# Patient Record
Sex: MALE | Race: WHITE | NOT HISPANIC OR LATINO | URBAN - METROPOLITAN AREA
[De-identification: names, ages, dates, MRNs, and addresses within clinical notes are randomized per-mention and may not be internally consistent; named-entity substitution may affect disease eponyms.]

---

## 2017-01-05 ENCOUNTER — OUTPATIENT (OUTPATIENT)
Dept: OUTPATIENT SERVICES | Facility: HOSPITAL | Age: 67
LOS: 1 days | End: 2017-01-05

## 2017-03-31 ENCOUNTER — OUTPATIENT (OUTPATIENT)
Dept: OUTPATIENT SERVICES | Facility: HOSPITAL | Age: 67
LOS: 1 days | End: 2017-03-31

## 2017-12-29 ENCOUNTER — TRANSCRIPTION ENCOUNTER (OUTPATIENT)
Age: 67
End: 2017-12-29

## 2018-01-07 ENCOUNTER — INPATIENT (INPATIENT)
Facility: HOSPITAL | Age: 68
LOS: 1 days | Discharge: HOME | End: 2018-01-09
Attending: INTERNAL MEDICINE | Admitting: FAMILY MEDICINE
Payer: MEDICARE

## 2018-01-07 ENCOUNTER — OUTPATIENT (OUTPATIENT)
Dept: OUTPATIENT SERVICES | Facility: HOSPITAL | Age: 68
LOS: 1 days | End: 2018-01-07

## 2018-01-07 PROCEDURE — 76770 US EXAM ABDO BACK WALL COMP: CPT | Mod: 26

## 2018-01-07 PROCEDURE — 76856 US EXAM PELVIC COMPLETE: CPT | Mod: 26

## 2018-01-07 PROCEDURE — 70450 CT HEAD/BRAIN W/O DYE: CPT | Mod: 26

## 2018-01-07 PROCEDURE — 71046 X-RAY EXAM CHEST 2 VIEWS: CPT | Mod: 26

## 2018-01-07 PROCEDURE — 99285 EMERGENCY DEPT VISIT HI MDM: CPT

## 2018-01-08 ENCOUNTER — OUTPATIENT (OUTPATIENT)
Dept: OUTPATIENT SERVICES | Facility: HOSPITAL | Age: 68
LOS: 1 days | End: 2018-01-08

## 2018-01-09 ENCOUNTER — OUTPATIENT (OUTPATIENT)
Dept: OUTPATIENT SERVICES | Facility: HOSPITAL | Age: 68
LOS: 1 days | End: 2018-01-09

## 2018-01-19 ENCOUNTER — OUTPATIENT (OUTPATIENT)
Dept: OUTPATIENT SERVICES | Facility: HOSPITAL | Age: 68
LOS: 1 days | End: 2018-01-19

## 2018-09-06 ENCOUNTER — OUTPATIENT (OUTPATIENT)
Dept: OUTPATIENT SERVICES | Facility: HOSPITAL | Age: 68
LOS: 1 days | End: 2018-09-06

## 2018-09-21 ENCOUNTER — OUTPATIENT (OUTPATIENT)
Dept: OUTPATIENT SERVICES | Facility: HOSPITAL | Age: 68
LOS: 1 days | End: 2018-09-21

## 2018-10-24 ENCOUNTER — OUTPATIENT (OUTPATIENT)
Dept: OUTPATIENT SERVICES | Facility: HOSPITAL | Age: 68
LOS: 1 days | End: 2018-10-24

## 2018-11-06 ENCOUNTER — OUTPATIENT (OUTPATIENT)
Dept: OUTPATIENT SERVICES | Facility: HOSPITAL | Age: 68
LOS: 1 days | End: 2018-11-06

## 2019-01-08 ENCOUNTER — OUTPATIENT (OUTPATIENT)
Dept: OUTPATIENT SERVICES | Facility: HOSPITAL | Age: 69
LOS: 1 days | End: 2019-01-08

## 2019-02-08 ENCOUNTER — OUTPATIENT (OUTPATIENT)
Dept: OUTPATIENT SERVICES | Facility: HOSPITAL | Age: 69
LOS: 1 days | End: 2019-02-08

## 2019-02-25 ENCOUNTER — OUTPATIENT (OUTPATIENT)
Dept: OUTPATIENT SERVICES | Facility: HOSPITAL | Age: 69
LOS: 1 days | End: 2019-02-25

## 2019-08-05 ENCOUNTER — EMERGENCY (EMERGENCY)
Facility: HOSPITAL | Age: 69
LOS: 1 days | End: 2019-08-05
Admitting: EMERGENCY MEDICINE
Payer: MEDICARE

## 2019-08-05 ENCOUNTER — INPATIENT (INPATIENT)
Facility: HOSPITAL | Age: 69
LOS: 3 days | Discharge: HOME | DRG: 64 | End: 2019-08-09
Attending: FAMILY MEDICINE | Admitting: HOSPITALIST
Payer: MEDICARE

## 2019-08-05 ENCOUNTER — TRANSCRIPTION ENCOUNTER (OUTPATIENT)
Age: 69
End: 2019-08-05

## 2019-08-05 VITALS
SYSTOLIC BLOOD PRESSURE: 147 MMHG | HEART RATE: 62 BPM | OXYGEN SATURATION: 99 % | TEMPERATURE: 98 F | DIASTOLIC BLOOD PRESSURE: 76 MMHG | RESPIRATION RATE: 18 BRPM | HEIGHT: 71 IN | WEIGHT: 229.94 LBS

## 2019-08-05 DIAGNOSIS — I61.9 NONTRAUMATIC INTRACEREBRAL HEMORRHAGE, UNSPECIFIED: ICD-10-CM

## 2019-08-05 DIAGNOSIS — Z98.890 OTHER SPECIFIED POSTPROCEDURAL STATES: Chronic | ICD-10-CM

## 2019-08-05 LAB
ALBUMIN SERPL ELPH-MCNC: 4 G/DL — SIGNIFICANT CHANGE UP (ref 3.3–5.2)
ALP SERPL-CCNC: 100 U/L — SIGNIFICANT CHANGE UP (ref 40–120)
ALT FLD-CCNC: 45 U/L — HIGH
ANION GAP SERPL CALC-SCNC: 13 MMOL/L — SIGNIFICANT CHANGE UP (ref 5–17)
APTT BLD: 25.3 SEC — LOW (ref 27.5–36.3)
AST SERPL-CCNC: 68 U/L — HIGH
BASOPHILS # BLD AUTO: 0.04 K/UL — SIGNIFICANT CHANGE UP (ref 0–0.2)
BASOPHILS NFR BLD AUTO: 0.5 % — SIGNIFICANT CHANGE UP (ref 0–2)
BILIRUB SERPL-MCNC: 0.5 MG/DL — SIGNIFICANT CHANGE UP (ref 0.4–2)
BUN SERPL-MCNC: 41 MG/DL — HIGH (ref 8–20)
CALCIUM SERPL-MCNC: 9.6 MG/DL — SIGNIFICANT CHANGE UP (ref 8.6–10.2)
CHLORIDE SERPL-SCNC: 105 MMOL/L — SIGNIFICANT CHANGE UP (ref 98–107)
CO2 SERPL-SCNC: 23 MMOL/L — SIGNIFICANT CHANGE UP (ref 22–29)
CREAT SERPL-MCNC: 2.51 MG/DL — HIGH (ref 0.5–1.3)
EOSINOPHIL # BLD AUTO: 0.17 K/UL — SIGNIFICANT CHANGE UP (ref 0–0.5)
EOSINOPHIL NFR BLD AUTO: 2.1 % — SIGNIFICANT CHANGE UP (ref 0–6)
GLUCOSE SERPL-MCNC: 110 MG/DL — SIGNIFICANT CHANGE UP (ref 70–115)
HCT VFR BLD CALC: 36.1 % — LOW (ref 39–50)
HGB BLD-MCNC: 11.7 G/DL — LOW (ref 13–17)
IMM GRANULOCYTES NFR BLD AUTO: 0.5 % — SIGNIFICANT CHANGE UP (ref 0–1.5)
INR BLD: 1.03 RATIO — SIGNIFICANT CHANGE UP (ref 0.88–1.16)
LYMPHOCYTES # BLD AUTO: 2.42 K/UL — SIGNIFICANT CHANGE UP (ref 1–3.3)
LYMPHOCYTES # BLD AUTO: 30.5 % — SIGNIFICANT CHANGE UP (ref 13–44)
MCHC RBC-ENTMCNC: 32.4 GM/DL — SIGNIFICANT CHANGE UP (ref 32–36)
MCHC RBC-ENTMCNC: 32.4 PG — SIGNIFICANT CHANGE UP (ref 27–34)
MCV RBC AUTO: 100 FL — SIGNIFICANT CHANGE UP (ref 80–100)
MONOCYTES # BLD AUTO: 0.65 K/UL — SIGNIFICANT CHANGE UP (ref 0–0.9)
MONOCYTES NFR BLD AUTO: 8.2 % — SIGNIFICANT CHANGE UP (ref 2–14)
NEUTROPHILS # BLD AUTO: 4.61 K/UL — SIGNIFICANT CHANGE UP (ref 1.8–7.4)
NEUTROPHILS NFR BLD AUTO: 58.2 % — SIGNIFICANT CHANGE UP (ref 43–77)
PLATELET # BLD AUTO: 269 K/UL — SIGNIFICANT CHANGE UP (ref 150–400)
POTASSIUM SERPL-MCNC: 4 MMOL/L — SIGNIFICANT CHANGE UP (ref 3.5–5.3)
POTASSIUM SERPL-SCNC: 4 MMOL/L — SIGNIFICANT CHANGE UP (ref 3.5–5.3)
PROT SERPL-MCNC: 7.2 G/DL — SIGNIFICANT CHANGE UP (ref 6.6–8.7)
PROTHROM AB SERPL-ACNC: 11.9 SEC — SIGNIFICANT CHANGE UP (ref 10–12.9)
RBC # BLD: 3.61 M/UL — LOW (ref 4.2–5.8)
RBC # FLD: 13.4 % — SIGNIFICANT CHANGE UP (ref 10.3–14.5)
SODIUM SERPL-SCNC: 141 MMOL/L — SIGNIFICANT CHANGE UP (ref 135–145)
WBC # BLD: 7.93 K/UL — SIGNIFICANT CHANGE UP (ref 3.8–10.5)
WBC # FLD AUTO: 7.93 K/UL — SIGNIFICANT CHANGE UP (ref 3.8–10.5)

## 2019-08-05 PROCEDURE — 70450 CT HEAD/BRAIN W/O DYE: CPT | Mod: 26

## 2019-08-05 PROCEDURE — 99285 EMERGENCY DEPT VISIT HI MDM: CPT

## 2019-08-05 PROCEDURE — 99222 1ST HOSP IP/OBS MODERATE 55: CPT

## 2019-08-05 PROCEDURE — 93010 ELECTROCARDIOGRAM REPORT: CPT

## 2019-08-05 PROCEDURE — 70450 CT HEAD/BRAIN W/O DYE: CPT | Mod: 26,77

## 2019-08-05 PROCEDURE — 99284 EMERGENCY DEPT VISIT MOD MDM: CPT

## 2019-08-05 RX ORDER — CARVEDILOL PHOSPHATE 80 MG/1
25 CAPSULE, EXTENDED RELEASE ORAL EVERY 12 HOURS
Refills: 0 | Status: DISCONTINUED | OUTPATIENT
Start: 2019-08-05 | End: 2019-08-06

## 2019-08-05 RX ORDER — CHLORHEXIDINE GLUCONATE 213 G/1000ML
1 SOLUTION TOPICAL
Refills: 0 | Status: DISCONTINUED | OUTPATIENT
Start: 2019-08-05 | End: 2019-08-09

## 2019-08-05 RX ORDER — NICARDIPINE HYDROCHLORIDE 30 MG/1
5 CAPSULE, EXTENDED RELEASE ORAL
Qty: 40 | Refills: 0 | Status: DISCONTINUED | OUTPATIENT
Start: 2019-08-05 | End: 2019-08-06

## 2019-08-05 RX ORDER — HYDRALAZINE HCL 50 MG
10 TABLET ORAL EVERY 6 HOURS
Refills: 0 | Status: DISCONTINUED | OUTPATIENT
Start: 2019-08-05 | End: 2019-08-06

## 2019-08-05 RX ORDER — ATORVASTATIN CALCIUM 80 MG/1
80 TABLET, FILM COATED ORAL AT BEDTIME
Refills: 0 | Status: DISCONTINUED | OUTPATIENT
Start: 2019-08-05 | End: 2019-08-09

## 2019-08-05 RX ORDER — IPRATROPIUM/ALBUTEROL SULFATE 18-103MCG
3 AEROSOL WITH ADAPTER (GRAM) INHALATION EVERY 6 HOURS
Refills: 0 | Status: DISCONTINUED | OUTPATIENT
Start: 2019-08-05 | End: 2019-08-09

## 2019-08-05 RX ADMIN — NICARDIPINE HYDROCHLORIDE 25 MG/HR: 30 CAPSULE, EXTENDED RELEASE ORAL at 23:04

## 2019-08-05 RX ADMIN — CARVEDILOL PHOSPHATE 25 MILLIGRAM(S): 80 CAPSULE, EXTENDED RELEASE ORAL at 23:23

## 2019-08-05 RX ADMIN — Medication 10 MILLIGRAM(S): at 22:30

## 2019-08-05 NOTE — ED ADULT NURSE NOTE - CHIEF COMPLAINT QUOTE
transfer from [facility].  Several weeks of intermittent headache.  Right leg weakness started last night.  Left thalamic bleed.  Alert and oriented X 4.

## 2019-08-05 NOTE — H&P ADULT - ASSESSMENT
68 yo male pmhx HTN, CVA, MI s/p 2 POP placement at [facility] (~3 years ago), Prostate Ca s/p radiation and prostatectomy (~2 years ago), former smoker (quit 3 years ago, 30 pack year) admitted with ICH.     NEURO: ICH, admit to micu, q1h neurochecks, bp control sbp 150-160  CV: HTN, hydralazine IV prn for BP control.  Goal -160.  Hydralazine IV PRN.    RESP: 30 pack year history with occasional inhaler use.  duonebs prn.   RENAL: CKD, avoid nephrotoxic medications, renally dose medications, trend urine output, bun/cr and electrolytes.  Replace electrolytes as needed  GI: NPO for now.   ENDO: No active issues.   ID: No active infectious process.    HEME: Holding a/c and antiplt therapy in setting of ICH.    DISPO: Full code. 70 yo male pmhx HTN, CVA, MI s/p 2 POP placement at [place] (~3 years ago), Prostate Ca s/p radiation and prostatectomy (~2 years ago), former smoker (quit 3 years ago, 30 pack year) admitted with ICH.     NEURO: ICH, admit to micu, q1h neurochecks, bp control sbp 150-160, keep HOB >30 degrees. Aspiration precautions. repeat head ct in 6 hours.     CV: HTN, hydralazine IV prn for BP control.  Goal -160.  Hydralazine IV PRN.  CAD s/p stent; TTE ordered.  RESP: 30 pack year history with occasional inhaler use.  duonebs prn.   RENAL: CKD, avoid nephrotoxic medications, renally dose medications, trend urine output, bun/cr and electrolytes.  Replace electrolytes as needed  GI: NPO for now.   ENDO: No active issues.   ID: No active infectious process.    HEME: Holding a/c and antiplt therapy in setting of ICH.    DISPO: Full code.

## 2019-08-05 NOTE — ED ADULT NURSE REASSESSMENT NOTE - NS ED NURSE REASSESS COMMENT FT1
PT awake and alert laying in bed on cardiac monitor with spouse at bedside. Pt states he "feels better than before" NIH scale score 0 at this time. Vitals taken. Awaiting CT scan, ICU consult and ADmission. Pt aware of plan of care.

## 2019-08-05 NOTE — ED ADULT TRIAGE NOTE - CHIEF COMPLAINT QUOTE
transfer from [facility].  Several weeks of intermittent headache.  Right leg weakness started last night.  Left thalamic bleed.  Alert and oriented X 4.

## 2019-08-05 NOTE — H&P ADULT - NSHPPHYSICALEXAM_GEN_ALL_CORE
GENERAL:  HEENT:  CV:  RESP:  ABD:  :  MSK:  EXT:  SKIN:  NEURO: A&O x3, pupils 3mm, equal GENERAL: Elderly, obese male, sitting in bed, appears comfortable  HEENT: NC/AT, PERRLA, dentition intact   CV: RRR, +s1, +S2, no murmurs rubs or gallops appreciated  RESP: Symmetrical thoarx expansion  upon respiration.  CTA bilaterally, no wheezing, rales or rhonchi appreciated  ABD: soft, nontender, nondistended, normoactive bowel sounds, no masses appreciated, exam limited by body habitus  EXT: No edema, nontender, DP pulses symmetrical   SKIN: Warm, no rashes appreciated   NEURO: A&O x3, pupils 3mm, symmetrical, CN 2-12 grossly intact.  Sensation intact throughout.  RUE 5/5 strength with +mild right pronation, no drift.  LUE strength 5/5, no deficit.  finger to nose intact. RLE 5/5 strength with mild difficulty performing heel to shin.  LLE 5/5 strength. Heel to shin in tact.

## 2019-08-05 NOTE — ED PROVIDER NOTE - CHIEF COMPLAINT
The patient is a 69y Male complaining of headache. The patient is a 69y Male complaining of right leg weakness

## 2019-08-05 NOTE — ED PROVIDER NOTE - OBJECTIVE STATEMENT
Pt. present to ED as a transfer from [facility] due to Right sided lower leg weakness. Symptoms started yesterday around 5pm. Pt. was dragging his leg. Pt. has hx of HTN/CVA. Pt. went to [facility] and had a head CT that showed a left Thalamic hemorrhage. Pt. was then sent here to the ED.

## 2019-08-05 NOTE — PATIENT PROFILE ADULT - NSPROEXTENSIONSOFSELF_GEN_A_NUR

## 2019-08-05 NOTE — H&P ADULT - NSICDXPASTMEDICALHX_GEN_ALL_CORE_FT
PAST MEDICAL HISTORY:  Cholesterol depletion     Coronary artery disease with hx of myocardial infarct w/o hx of CABG s/p 2 stents    CVA (cerebral vascular accident)     HTN (hypertension)     Prostate cancer s/p radiation and prostatectomy

## 2019-08-05 NOTE — PROVIDER CONTACT NOTE (EICU) - SITUATION
70 yo male with hx of CAD s/p PCI, HTN, prior CVA, presented to [facility] with right sided weakness, transferred to [facility] due to left thalamic ICH, hypertensive emergency.    He is alert and awake, protecting airway.  Currently still hypertensive, starting nicardipine drip.  Hourly neuro checks ordered.

## 2019-08-05 NOTE — ED ADULT NURSE NOTE - OBJECTIVE STATEMENT
transfer from [facility].  Several weeks of intermittent headache.  Right leg weakness started last night.  Left thalamic bleed.  Alert and oriented X 4. Pt denies any headache

## 2019-08-05 NOTE — CONSULT NOTE ADULT - CONSULT REASON
TRANS FROM [facility] WITH LEFT GANGLIA HEMORRHAGE TRANS FROM [facility] WITH A left thalamic intraparenchymal hematoma measures 1.4 x 0.9 cm with mild   surrounding vasogenic edema.

## 2019-08-05 NOTE — ED ADULT NURSE REASSESSMENT NOTE - NS ED NURSE REASSESS COMMENT FT1
As per MD, Dr. Goetz - pt can go to CT scan without monitor because his symptoms have resolved, and he has been stable. Report given to SICU RN, after CT pt to go to SICU. PT aware of plan of care, no complaints of pain or discomfort at this time.

## 2019-08-05 NOTE — CONSULT NOTE ADULT - SUBJECTIVE AND OBJECTIVE BOX
CC: Patient is a 69y old  Male who presents with a chief complaint of RLE weakness  SOURCE: Patient himself, competent.   HPI:  Patient is a 69y old  Male who presents with a chief complaint of RLE weakness x's one day. States onset yesterday with difficulty holding weight and dragging leg when walking.      pt c/o + -headache  /10 on the pain scale   + - Nausea /+ - Vomiting  admits denies weakness  admits denies numbness/ tingling  admist denies visual changes  admist denies C/T/LS  Spine pain    PAST MEDICAL &   Cholesterol   CVA 2016  HTN   PROSTATE CA      SURGICAL HISTORY:  PROSTATECTOMY  2 CARDIAC STENTS 2016      SOCIAL HISTORY:  - EtOH, +  tobacco 1PPD X'S 30 YRS, QUIT 3 YRS AGO,  - drugs    FAMILY HISTORY: MOTHER: HTN, RENAL FAILURE      ROS:  CONSTITUTIONAL: No fever, weight loss, or fatigue  EYES: No eye pain, visual disturbances, or discharge  ENMT:  No difficulty hearing, tinnitus, vertigo; No sinus or throat pain  NECK: No pain or stiffness  RESPIRATORY: No cough, wheezing, chills or hemoptysis; No shortness of breath  CARDIOVASCULAR: No chest pain, palpitations, dizziness, or leg swelling  GASTROINTESTINAL: No abdominal or epigastric pain. No nausea, vomiting, or hematemesis; No diarrhea + constipation ,  No melena or hematochezia.  GENITOURINARY: No dysuria, frequency, hematuria, or incontinence  NEUROLOGICAL: No headaches, memory loss, loss of strength, numbness, or tremors prior to 24 hrs recent,  SKIN: No itching, burning, rashes, or lesions   LYMPH NODES: No enlarged glands  ENDOCRINE: No heat or cold intolerance; No hair loss  MUSCULOSKELETAL: No joint pain or swelling; No muscle, back, or extremity pain  PSYCHIATRIC: No depression, anxiety, mood swings, or difficulty sleeping  HEME/LYMPH: No easy bruising, or bleeding gums  ALLERGY AND IMMUNOLOGIC: No hives or eczema      MEDICATIONS  (STANDING): 243 mg asa qhs,  FOLIC ACID, COREG, K+, FISH OIL CENTRUM VIT, ROSUVASTATIN,       Allergies: No Known Allergies    Vital Signs Last 24 Hrs  T(C): 36.9 (05 Aug 2019 19:47), Max: 36.9 (05 Aug 2019 19:47)  T(F): 98.4 (05 Aug 2019 19:47), Max: 98.4 (05 Aug 2019 19:47)  HR: 64 (05 Aug 2019 19:47) (62 - 78)  BP: 170/72 (05 Aug 2019 19:47) (147/76 - 170/72)  BP(mean): 104 (05 Aug 2019 19:47) (104 - 104)  RR: 20 (05 Aug 2019 19:47) (18 - 20)  SpO2: 100% (05 Aug 2019 19:47) (98% - 100%)    PHYSICAL EXAM:  GENERAL: NAD, well-groomed, well-developed  HEAD:  Atraumatic, Normocephalic  EYES: EOMI, PERRLA, conjunctiva and sclera clear  NECK: Supple, No JVD  NERVOUS SYSTEM:  Alert & Oriented X3, Good concentration;   perrla, eomi, facial symmetric, cranial nerves 2-12 intact.   Motor Strength 5/5 B/L upper and lower extremities; sensory intact all.  fine motor and coordination intact bilateral upper extrem, very slight difficulty with heel to shin RLE,   no pronator drift.   CHEST/LUNG: Clear to percussion bilaterally; No rales, rhonchi, wheezing, or rubs  HEART: Regular rate  ABDOMEN: Soft, Nontender, Nondistended; Bowel sounds present  EXTREMITIES:  2+ Peripheral Pulses radial and DP, No clubbing, cyanosis, or edema  LYMPH: No lymphadenopathy noted  SKIN: No rashes or lesions      RADIOLOGY & ADDITIONAL STUDIES:  CT HEAD COMPLETE PENDING RAD READ                        11.7   7.93  )-----------( 269      ( 05 Aug 2019 18:52 )             36.1     08-05    141  |  105  |  41.0<H>  ----------------------------<  110  4.0   |  23.0  |  2.51<H>    Ca    9.6      05 Aug 2019 18:52    TPro  7.2  /  Alb  4.0  /  TBili  0.5  /  DBili  x   /  AST  68<H>  /  ALT  45<H>  /  AlkPhos  100  08-05    PT/INR - ( 05 Aug 2019 20:08 )   PT: 11.9 sec;   INR: 1.03 ratio         PTT - ( 05 Aug 2019 20:08 )  PTT:25.3 sec CC: Patient is a 69y old  Male who presents with a chief complaint of RLE weakness  SOURCE: Patient himself, competent.   HPI:  Patient is a 69y old  Male who presents with a chief complaint of RLE weakness x's one day. States onset yesterday with difficulty holding weight and dragging leg when walking.      pt c/o + -headache  /10 on the pain scale   + - Nausea /+ - Vomiting  admits denies weakness  admits denies numbness/ tingling  admist denies visual changes  admist denies C/T/LS  Spine pain    PAST MEDICAL &   Cholesterol   CVA 2016  HTN   PROSTATE CA      SURGICAL HISTORY:  PROSTATECTOMY  2 CARDIAC STENTS 2016      SOCIAL HISTORY:  - EtOH, +  tobacco 1PPD X'S 30 YRS, QUIT 3 YRS AGO,  - drugs    FAMILY HISTORY: MOTHER: HTN, RENAL FAILURE      ROS:  CONSTITUTIONAL: No fever, weight loss, or fatigue  EYES: No eye pain, visual disturbances, or discharge  ENMT:  No difficulty hearing, tinnitus, vertigo; No sinus or throat pain  NECK: No pain or stiffness  RESPIRATORY: No cough, wheezing, chills or hemoptysis; No shortness of breath  CARDIOVASCULAR: No chest pain, palpitations, dizziness, or leg swelling  GASTROINTESTINAL: No abdominal or epigastric pain. No nausea, vomiting, or hematemesis; No diarrhea + constipation ,  No melena or hematochezia.  GENITOURINARY: No dysuria, frequency, hematuria, or incontinence  NEUROLOGICAL: No headaches, memory loss, loss of strength, numbness, or tremors prior to 24 hrs recent,  SKIN: No itching, burning, rashes, or lesions   LYMPH NODES: No enlarged glands  ENDOCRINE: No heat or cold intolerance; No hair loss  MUSCULOSKELETAL: No joint pain or swelling; No muscle, back, or extremity pain  PSYCHIATRIC: No depression, anxiety, mood swings, or difficulty sleeping  HEME/LYMPH: No easy bruising, or bleeding gums  ALLERGY AND IMMUNOLOGIC: No hives or eczema      MEDICATIONS  (STANDING): 243 mg asa qhs,  FOLIC ACID, COREG, K+, FISH OIL CENTRUM VIT, ROSUVASTATIN,       Allergies: No Known Allergies    Vital Signs Last 24 Hrs  T(C): 36.9 (05 Aug 2019 19:47), Max: 36.9 (05 Aug 2019 19:47)  T(F): 98.4 (05 Aug 2019 19:47), Max: 98.4 (05 Aug 2019 19:47)  HR: 64 (05 Aug 2019 19:47) (62 - 78)  BP: 170/72 (05 Aug 2019 19:47) (147/76 - 170/72)  BP(mean): 104 (05 Aug 2019 19:47) (104 - 104)  RR: 20 (05 Aug 2019 19:47) (18 - 20)  SpO2: 100% (05 Aug 2019 19:47) (98% - 100%)    PHYSICAL EXAM:  GENERAL: NAD, well-groomed, well-developed  HEAD:  Atraumatic, Normocephalic  EYES: EOMI, PERRLA, conjunctiva and sclera clear  NECK: Supple, No JVD  NERVOUS SYSTEM:  Alert & Oriented X3, Good concentration;   perrla, eomi, facial symmetric, cranial nerves 2-12 intact.   Motor Strength 5/5 B/L upper and lower extremities; sensory intact all.  fine motor and coordination intact bilateral upper extrem, very slight difficulty with heel to shin RLE,   no pronator drift.   CHEST/LUNG: Clear to percussion bilaterally; No rales, rhonchi, wheezing, or rubs  HEART: Regular rate  ABDOMEN: Soft, Nontender, Nondistended; Bowel sounds present  EXTREMITIES:  2+ Peripheral Pulses radial and DP, No clubbing, cyanosis, or edema  LYMPH: No lymphadenopathy noted  SKIN: No rashes or lesions      RADIOLOGY & ADDITIONAL STUDIES:  CT HEAD Parenchymal volume loss is noted with prominent ventricles and sulci.   Chronic microvascular ischemic changes are identified. Tiny old lacunar   infarcts are seen in the left lentiform nucleus. A small hypodensity is seen   in the right frontal nucleus which could represent a lacunar infarct of   uncertain age versus focal chronic microvascular ischemic change. No acute   territorial infarct is demonstrated.     A left thalamic intraparenchymal hematoma measures 1.4 x 0.9 cm with mild   surrounding vasogenic edema.     Evaluation of the osseous structures with the appropriate window appears   unremarkable. Vascular calcifications are noted. The visualized paranasal   sinuses and mastoid air cells are clear.     IMPRESSION:   Left thalamic hematoma as described above.                           11.7   7.93  )-----------( 269      ( 05 Aug 2019 18:52 )             36.1     08-05    141  |  105  |  41.0<H>  ----------------------------<  110  4.0   |  23.0  |  2.51<H>    Ca    9.6      05 Aug 2019 18:52    TPro  7.2  /  Alb  4.0  /  TBili  0.5  /  DBili  x   /  AST  68<H>  /  ALT  45<H>  /  AlkPhos  100  08-05    PT/INR - ( 05 Aug 2019 20:08 )   PT: 11.9 sec;   INR: 1.03 ratio         PTT - ( 05 Aug 2019 20:08 )  PTT:25.3 sec

## 2019-08-05 NOTE — CONSULT NOTE ADULT - ASSESSMENT
IMP:  LEFT BASAL GANG HEMORRHAGE        PLAN: DISCUSSED WITH DR YARI LINDSEY  Q1H NEURO CKS  HOB UP 35 DEGREES  NO ANTI COAGS, NO ANTI PLATELETS  NO NARCOTICS NO SEDATION    CT HEAD 6 HRS FROM FIRST ( DONE PENDING READ)  CTA BRAIN WHEN CR/BUN ALLOWS    SBP<150    ASPRIN ASSAY, TREAT  PRN IMP:  CT HEAD with A left thalamic intraparenchymal hematoma measures 1.4 x 0.9 cm with mild   surrounding vasogenic edema.  neuro exam intact except for slight difficulty with heel shin RLE.         PLAN: DISCUSSED WITH DR YARI LINDSEY  Q1H NEURO CKS  HOB UP 35 DEGREES  NO ANTI COAGS, NO ANTI PLATELETS  NO NARCOTICS NO SEDATION    CT HEAD 6 HRS FROM FIRST ( DONE PENDING READ)  CTA BRAIN WHEN CR/BUN ALLOWS    SBP<150    ASPRIN ASSAY, TREAT  PRN

## 2019-08-05 NOTE — H&P ADULT - HISTORY OF PRESENT ILLNESS
70 yo male pmhx HTN, CVA, MI s/p 2 POP placement at [facility] (~3 years ago), Prostate Ca s/p radiation and prostatectomy (~2 years ago), former smoker (quit 3 years ago, 30 pack year) transferred from [facility] with ICH.  Patient endorses that yesterday afternoon at ~1700 he began to experience right leg weakness and difficulty walking.  Patient presented [place] [facility] with persistent symptoms and found to have left thalmic ICH prompting transfer to [facility].  Upon arrival to [facility], patient A&O x3, with minimal right lower extremity weakness.  Patient endorses difficult walking, RLE weakness, no other complaints at this time. Admit to MICU.

## 2019-08-05 NOTE — ED PROVIDER NOTE - PROGRESS NOTE DETAILS
PT. had neuroSx PA and also ICU Consult. Pt. with elevated creatinine. Pt. unable to obtain CTA of head. ICU and NeuroSX PA made aware. PT. will go for CT head without contrast.

## 2019-08-05 NOTE — ED ADULT NURSE REASSESSMENT NOTE - NS ED NURSE REASSESS COMMENT FT1
As per md, RN does not need monitor for CT scan and RN does not need to accompany pt there. Pt brought to CT, CT to call me when CT finished.

## 2019-08-06 DIAGNOSIS — I61.9 NONTRAUMATIC INTRACEREBRAL HEMORRHAGE, UNSPECIFIED: ICD-10-CM

## 2019-08-06 LAB
ALBUMIN SERPL ELPH-MCNC: 3.5 G/DL — SIGNIFICANT CHANGE UP (ref 3.3–5.2)
ALP SERPL-CCNC: 73 U/L — SIGNIFICANT CHANGE UP (ref 40–120)
ALT FLD-CCNC: 37 U/L — SIGNIFICANT CHANGE UP
ANION GAP SERPL CALC-SCNC: 11 MMOL/L — SIGNIFICANT CHANGE UP (ref 5–17)
AST SERPL-CCNC: 53 U/L — HIGH
BILIRUB SERPL-MCNC: 0.6 MG/DL — SIGNIFICANT CHANGE UP (ref 0.4–2)
BUN SERPL-MCNC: 44 MG/DL — HIGH (ref 8–20)
CALCIUM SERPL-MCNC: 8.7 MG/DL — SIGNIFICANT CHANGE UP (ref 8.6–10.2)
CHLORIDE SERPL-SCNC: 110 MMOL/L — HIGH (ref 98–107)
CK SERPL-CCNC: 87 U/L — SIGNIFICANT CHANGE UP (ref 30–200)
CO2 SERPL-SCNC: 24 MMOL/L — SIGNIFICANT CHANGE UP (ref 22–29)
CREAT SERPL-MCNC: 2.48 MG/DL — HIGH (ref 0.5–1.3)
GAS PNL BLDA: SIGNIFICANT CHANGE UP
GLUCOSE BLDC GLUCOMTR-MCNC: 171 MG/DL — HIGH (ref 70–99)
GLUCOSE SERPL-MCNC: 177 MG/DL — HIGH (ref 70–115)
HCV AB S/CO SERPL IA: 0.06 S/CO — SIGNIFICANT CHANGE UP (ref 0–0.99)
HCV AB SERPL-IMP: SIGNIFICANT CHANGE UP
MAGNESIUM SERPL-MCNC: 2.5 MG/DL — SIGNIFICANT CHANGE UP (ref 1.6–2.6)
NT-PROBNP SERPL-SCNC: 259 PG/ML — SIGNIFICANT CHANGE UP (ref 0–300)
PHOSPHATE SERPL-MCNC: 3.6 MG/DL — SIGNIFICANT CHANGE UP (ref 2.4–4.7)
POTASSIUM SERPL-MCNC: 3.6 MMOL/L — SIGNIFICANT CHANGE UP (ref 3.5–5.3)
POTASSIUM SERPL-SCNC: 3.6 MMOL/L — SIGNIFICANT CHANGE UP (ref 3.5–5.3)
PROT SERPL-MCNC: 6.2 G/DL — LOW (ref 6.6–8.7)
SODIUM SERPL-SCNC: 145 MMOL/L — SIGNIFICANT CHANGE UP (ref 135–145)
TROPONIN T SERPL-MCNC: <0.01 NG/ML — SIGNIFICANT CHANGE UP (ref 0–0.06)

## 2019-08-06 PROCEDURE — 93010 ELECTROCARDIOGRAM REPORT: CPT

## 2019-08-06 PROCEDURE — 70450 CT HEAD/BRAIN W/O DYE: CPT | Mod: 26,77

## 2019-08-06 PROCEDURE — 99291 CRITICAL CARE FIRST HOUR: CPT

## 2019-08-06 PROCEDURE — 70450 CT HEAD/BRAIN W/O DYE: CPT | Mod: 26

## 2019-08-06 PROCEDURE — 99232 SBSQ HOSP IP/OBS MODERATE 35: CPT

## 2019-08-06 PROCEDURE — 99231 SBSQ HOSP IP/OBS SF/LOW 25: CPT

## 2019-08-06 PROCEDURE — 93306 TTE W/DOPPLER COMPLETE: CPT | Mod: 26

## 2019-08-06 RX ORDER — ONDANSETRON 8 MG/1
4 TABLET, FILM COATED ORAL ONCE
Refills: 0 | Status: COMPLETED | OUTPATIENT
Start: 2019-08-06 | End: 2019-08-06

## 2019-08-06 RX ORDER — LEVETIRACETAM 250 MG/1
1000 TABLET, FILM COATED ORAL ONCE
Refills: 0 | Status: COMPLETED | OUTPATIENT
Start: 2019-08-06 | End: 2019-08-06

## 2019-08-06 RX ORDER — NOREPINEPHRINE BITARTRATE/D5W 8 MG/250ML
0.05 PLASTIC BAG, INJECTION (ML) INTRAVENOUS
Qty: 8 | Refills: 0 | Status: DISCONTINUED | OUTPATIENT
Start: 2019-08-06 | End: 2019-08-06

## 2019-08-06 RX ORDER — SODIUM CHLORIDE 9 MG/ML
1000 INJECTION INTRAMUSCULAR; INTRAVENOUS; SUBCUTANEOUS ONCE
Refills: 0 | Status: COMPLETED | OUTPATIENT
Start: 2019-08-06 | End: 2019-08-06

## 2019-08-06 RX ORDER — SODIUM CHLORIDE 9 MG/ML
500 INJECTION INTRAMUSCULAR; INTRAVENOUS; SUBCUTANEOUS ONCE
Refills: 0 | Status: COMPLETED | OUTPATIENT
Start: 2019-08-06 | End: 2019-08-06

## 2019-08-06 RX ORDER — SODIUM CHLORIDE 9 MG/ML
10 INJECTION INTRAMUSCULAR; INTRAVENOUS; SUBCUTANEOUS
Refills: 0 | Status: DISCONTINUED | OUTPATIENT
Start: 2019-08-06 | End: 2019-08-09

## 2019-08-06 RX ORDER — POTASSIUM CHLORIDE 20 MEQ
10 PACKET (EA) ORAL
Refills: 0 | Status: COMPLETED | OUTPATIENT
Start: 2019-08-06 | End: 2019-08-06

## 2019-08-06 RX ORDER — HYDRALAZINE HCL 50 MG
10 TABLET ORAL EVERY 4 HOURS
Refills: 0 | Status: DISCONTINUED | OUTPATIENT
Start: 2019-08-06 | End: 2019-08-07

## 2019-08-06 RX ADMIN — Medication 10.03 MICROGRAM(S)/KG/MIN: at 10:45

## 2019-08-06 RX ADMIN — CARVEDILOL PHOSPHATE 25 MILLIGRAM(S): 80 CAPSULE, EXTENDED RELEASE ORAL at 05:42

## 2019-08-06 RX ADMIN — Medication 10 MILLIGRAM(S): at 05:42

## 2019-08-06 RX ADMIN — SODIUM CHLORIDE 10 MILLILITER(S): 9 INJECTION INTRAMUSCULAR; INTRAVENOUS; SUBCUTANEOUS at 14:03

## 2019-08-06 RX ADMIN — Medication 100 MILLIEQUIVALENT(S): at 15:23

## 2019-08-06 RX ADMIN — ATORVASTATIN CALCIUM 80 MILLIGRAM(S): 80 TABLET, FILM COATED ORAL at 22:00

## 2019-08-06 RX ADMIN — Medication 100 MILLIEQUIVALENT(S): at 15:22

## 2019-08-06 RX ADMIN — CHLORHEXIDINE GLUCONATE 1 APPLICATION(S): 213 SOLUTION TOPICAL at 06:58

## 2019-08-06 RX ADMIN — ONDANSETRON 4 MILLIGRAM(S): 8 TABLET, FILM COATED ORAL at 09:28

## 2019-08-06 RX ADMIN — SODIUM CHLORIDE 1000 MILLILITER(S): 9 INJECTION INTRAMUSCULAR; INTRAVENOUS; SUBCUTANEOUS at 09:45

## 2019-08-06 RX ADMIN — LEVETIRACETAM 400 MILLIGRAM(S): 250 TABLET, FILM COATED ORAL at 10:42

## 2019-08-06 NOTE — DISCHARGE NOTE NURSING/CASE MANAGEMENT/SOCIAL WORK - NSDCPEPTSTRK_GEN_ALL_CORE
Need for follow up after discharge/Stroke education booklet/Call 911 for stroke/Prescribed medications/Stroke support groups for patients, families, and friends/Risk factors for stroke/Stroke warning signs and symptoms/Signs and symptoms of stroke

## 2019-08-06 NOTE — PROGRESS NOTE ADULT - ASSESSMENT
68 yo male with L basal ganglia ICH, score 0, radiographically and neurologically stable.  Bradycardia, junctional rhythm; hypotension improved with hydration and Trendelenburg, no Atropine given. No clinical or lab signs of MI. Etiology - r/o cardiac, possible contribution of acute cerebral injury and ?orthostatic (also on BB - hence, may have impaired reflex cardiac response).  Extensive cardiac history MI s/p 2 POP placement at [facility] (~3 years ago). HTN. Remote CVA.  Former smoker (quit 3 years ago, 30 pack year).  Prostate Ca s/p radiation and prostatectomy (~2 years ago).    Plan:  -neurochecks q 2hr for today; telemetry  -would keep Na in 140-150 range to prevent cerebral swelling  -received Keppra for possible h/o of epilepsy - was unclear - would hold it for now as the event seems cardiac  -rest as per primary team , including Atropine/pacer at bedside, electrolyte control, further cardiac work-up  -will follow

## 2019-08-06 NOTE — DISCHARGE NOTE NURSING/CASE MANAGEMENT/SOCIAL WORK - NSDCDPATPORTLINK_GEN_ALL_CORE
You can access the [email or web address], offered by [facility], by registering with the following website: [email or web address]

## 2019-08-06 NOTE — PROGRESS NOTE ADULT - ASSESSMENT
Pt s/p Left thalamic hemorrhage, tx from [facility] w RLE weakness/ numbness that has resolved when evaluated today.  pt passed S/S eval    PMHx of CVA 3 years ago ago, prostate CA, CAD, HLD and s/p CABG/ cardio stents 3 years ago, prostatectomy        Q1H NEURO CKS  HOB UP > 30 DEGREES  NO ANTI COAGS, NO ANTI PLATELETS  NO NARCOTICS NO SEDATION  CTA BRAIN WHEN CR/BUN ALLOWS  goal SBP<150  ASPRIN ASSAY, TREAT  PRN

## 2019-08-06 NOTE — PROGRESS NOTE ADULT - SUBJECTIVE AND OBJECTIVE BOX
Patient is a 69y old  Male who presents with a chief complaint of ICH (05 Aug 2019 21:50)      BRIEF HOSPITAL COURSE: ***      Events last 24 hours: ***      PAST MEDICAL & SURGICAL HISTORY:  Coronary artery disease with hx of myocardial infarct w/o hx of CABG: s/p 2 stents  Prostate cancer: s/p radiation and prostatectomy  Cholesterol depletion  CVA (cerebral vascular accident)  HTN (hypertension)  History of prostate surgery        SOCIAL HISTORY:      Review of Systems:  CONSTITUTIONAL: No fever, chills, or fatigue  EYES: No visual disturbances  ENMT:  No difficulty hearing  NECK: No pain  RESPIRATORY: No cough. No shortness of breath  CARDIOVASCULAR: No chest pain, palpitations, or leg swelling  GASTROINTESTINAL: No abdominal pain. No nausea, vomiting, diarrhea, or constipation. No hematemesis, melena or hematochezia  GENITOURINARY: No dysuria, frequency, hematuria, or incontinence  NEUROLOGICAL: No headaches, loss of strength, numbness, or tremors  SKIN: No rashes  MUSCULOSKELETAL: No back or extremity pain. No calf pain  PSYCHIATRIC: No depression, anxiety, or difficulty sleeping    [  ] Due to altered mental status/intubation, subjective information was not able to be obtained from the patient. History was obtained, to the extent possible, from review of the chart and collateral sources of information.      Medications:    carvedilol 25 milliGRAM(s) Oral every 12 hours  hydrALAZINE Injectable 10 milliGRAM(s) IV Push every 6 hours  niCARdipine Infusion 5 mG/Hr IV Continuous <Continuous>  norepinephrine Infusion 0.05 MICROgram(s)/kG/Min IV Continuous <Continuous>    ALBUTerol/ipratropium for Nebulization 3 milliLiter(s) Nebulizer every 6 hours PRN              atorvastatin 80 milliGRAM(s) Oral at bedtime    sodium chloride 0.9% Bolus 500 milliLiter(s) IV Bolus once  sodium chloride 0.9% lock flush 10 milliLiter(s) IV Push every 1 hour PRN      chlorhexidine 4% Liquid 1 Application(s) Topical <User Schedule>            ICU Vital Signs Last 24 Hrs  T(C): 36.9 (06 Aug 2019 08:00), Max: 36.9 (05 Aug 2019 19:47)  T(F): 98.4 (06 Aug 2019 08:00), Max: 98.4 (05 Aug 2019 19:47)  HR: 62 (06 Aug 2019 10:44) (44 - 96)  BP: 135/61 (06 Aug 2019 10:44) (58/41 - 204/93)  BP(mean): 88 (06 Aug 2019 10:44) (44 - 133)  ABP: --  ABP(mean): --  RR: 12 (06 Aug 2019 10:44) (0 - 30)  SpO2: 100% (06 Aug 2019 10:44) (92% - 100%)          I&O's Detail    05 Aug 2019 07:01  -  06 Aug 2019 07:00  --------------------------------------------------------  IN:    niCARdipine Infusion: 37.5 mL  Total IN: 37.5 mL    OUT:  Total OUT: 0 mL    Total NET: 37.5 mL            LABS:                        11.7   7.93  )-----------( 269      ( 05 Aug 2019 18:52 )             36.1     08-05    141  |  105  |  41.0<H>  ----------------------------<  110  4.0   |  23.0  |  2.51<H>    Ca    9.6      05 Aug 2019 18:52    TPro  7.2  /  Alb  4.0  /  TBili  0.5  /  DBili  x   /  AST  68<H>  /  ALT  45<H>  /  AlkPhos  100  08-05          CAPILLARY BLOOD GLUCOSE      POCT Blood Glucose.: 171 mg/dL (06 Aug 2019 10:39)    PT/INR - ( 05 Aug 2019 20:08 )   PT: 11.9 sec;   INR: 1.03 ratio         PTT - ( 05 Aug 2019 20:08 )  PTT:25.3 sec    CULTURES:        Physical Examination:    General: No acute distress.      HEENT: Pupils equal, reactive to light.  Symmetric.    PULM: Clear to auscultation bilaterally, no significant sputum production    CVS: Regular rate and rhythm, no murmurs, rubs, or gallops    ABD: Soft, nondistended, nontender, normoactive bowel sounds, no masses    EXT: No edema, nontender    SKIN: Warm and well perfused, no rashes noted.    NEURO: Alert, oriented, interactive, nonfocal        RADIOLOGY: ***    INVASIVE LINES:  INDWELLING CHAPARRO:  VTE PROPHYLAXIS:  CAM ICU:  CODE STATUS:    CRITICAL CARE TIME SPENT: *** minutes spent performing frequent bedside reassessments and augmenting plan of care to address problems of acute critical illness that pose high probability of life threatening deterioration and/or end organ damage/dysfunction and discussing goals of care with patient/family, non-inclusive of time spent on procedures performed. Patient is a 69y old  Male who presents with a chief complaint of ICH (05 Aug 2019 21:50)      BRIEF HOSPITAL COURSE: 68 yo male PMHx HTN, CVA, MI s/p 2 POP placement at [facility] (~3 years ago), Prostate Ca s/p radiation and prostatectomy (~2 years ago), former smoker (quit 3 years ago, 30 pack year) transferred from [facility] with ICH.  Patient endorsef that at ~1700 the day prior to initial ED presentation, he began to experience right leg weakness and difficulty walking.  Patient presented to [facility] with persistent symptoms and found to have left thalamic ICH prompting transfer to [facility] for neurosurgical evaluation.  Upon arrival to [facility], patient A&O x3, with minimal right lower extremity weakness. Admitted to MICU in conjunction with neurosurgical consultation.       Events last 24 hours: Patient was initially doing well this morning, symptoms resolved, was OOB to chair and weaned off Nicardipine drip for several hours. Called to bedside by Neurosurgical team, on re-evaluation patient was suddenly hypotensive with SBP 50-60's, and bradycardic with HR 40's. HR varied from 's on tele monitor. Patient was lethargic and mentation was intermittently altered. He was given 1L NS bolus and started on Norepinephrine. STAT EKG without acute ST segment changes. Urgent R femoral TLC and arterial line placed for access and hemodynamic monitoring. Labs performed, troponin negative, persistent ROSIO but no electrolyte derangements or acidosis. A STAT head CT was performed to r/o extension of ICH, which was stable. Patient's condition spontaneously resolved, weaned off Norepinephrine, though remained lethargic. ABG performed to r/o hypercapnia.      PAST MEDICAL & SURGICAL HISTORY:  Coronary artery disease with hx of myocardial infarct w/o hx of CABG: s/p 2 stents  Prostate cancer: s/p radiation and prostatectomy  Cholesterol depletion  CVA (cerebral vascular accident)  HTN (hypertension)  History of prostate surgery      SOCIAL HISTORY: unable to obtain due to altered mental status      Review of Systems: Due to altered mental status, subjective information was not able to be obtained from the patient. History was obtained, to the extent possible, from review of the chart and collateral sources of information.      Medications:  carvedilol 25 milliGRAM(s) Oral every 12 hours  hydrALAZINE Injectable 10 milliGRAM(s) IV Push every 6 hours  niCARdipine Infusion 5 mG/Hr IV Continuous <Continuous>  norepinephrine Infusion 0.05 MICROgram(s)/kG/Min IV Continuous <Continuous>  ALBUTerol/ipratropium for Nebulization 3 milliLiter(s) Nebulizer every 6 hours PRN  atorvastatin 80 milliGRAM(s) Oral at bedtime  sodium chloride 0.9% Bolus 500 milliLiter(s) IV Bolus once  sodium chloride 0.9% lock flush 10 milliLiter(s) IV Push every 1 hour PRN  chlorhexidine 4% Liquid 1 Application(s) Topical <User Schedule>      ICU Vital Signs Last 24 Hrs  T(C): 36.9 (06 Aug 2019 08:00), Max: 36.9 (05 Aug 2019 19:47)  T(F): 98.4 (06 Aug 2019 08:00), Max: 98.4 (05 Aug 2019 19:47)  HR: 62 (06 Aug 2019 10:44) (44 - 96)  BP: 135/61 (06 Aug 2019 10:44) (58/41 - 204/93)  BP(mean): 88 (06 Aug 2019 10:44) (44 - 133)  ABP: --  ABP(mean): --  RR: 12 (06 Aug 2019 10:44) (0 - 30)  SpO2: 100% (06 Aug 2019 10:44) (92% - 100%)      I&O's Detail    05 Aug 2019 07:01  -  06 Aug 2019 07:00  --------------------------------------------------------  IN:    niCARdipine Infusion: 37.5 mL  Total IN: 37.5 mL    OUT:  Total OUT: 0 mL    Total NET: 37.5 mL      LABS:                        11.7   7.93  )-----------( 269      ( 05 Aug 2019 18:52 )             36.1     08-05    141  |  105  |  41.0<H>  ----------------------------<  110  4.0   |  23.0  |  2.51<H>    Ca    9.6      05 Aug 2019 18:52    TPro  7.2  /  Alb  4.0  /  TBili  0.5  /  DBili  x   /  AST  68<H>  /  ALT  45<H>  /  AlkPhos  100  08-05        CAPILLARY BLOOD GLUCOSE      POCT Blood Glucose.: 171 mg/dL (06 Aug 2019 10:39)    PT/INR - ( 05 Aug 2019 20:08 )   PT: 11.9 sec;   INR: 1.03 ratio         PTT - ( 05 Aug 2019 20:08 )  PTT:25.3 sec    CULTURES:        Physical Examination:    General: No acute distress.      HEENT: Pupils unequal, 1mm R and 2mm L, reactive to light.    PULM: Clear to auscultation bilaterally, no significant sputum production    CVS: Regular rate and rhythm, no murmurs    ABD: Soft, nondistended, nontender, normoactive bowel sounds    EXT: No edema, nontender    SKIN: Warm and well perfused, no rashes noted.    NEURO: Lethargic, arousable to verbal stimuli, oriented, interactive, nonfocal      RADIOLOGY:   < from: CT Head No Cont (08.06.19 @ 11:38) >     EXAM:  CT BRAIN                          PROCEDURE DATE:  08/06/2019      INTERPRETATION:  .    CLINICAL INFORMATION: Altered mental status.    TECHNIQUE: Multiple axial CT images of the head were obtained without   contrast. Sagittal and coronal reconstructed images were acquired from   the source data.    COMPARISON: Most recent prior head CT exam from earlier today.    FINDINGS: Previously noted evolving acute parenchymal hematoma within the   left superior thalamus appears unchanged. There is unchanged surrounding   vasogenic edema and mild mass effect.    Apparent hypoattenuation within the bilateral occipital lobes is likely   artifactual.    No new areas of acute intracranial hemorrhage are noted.    Chronic lacunar infarcts areagain noted within the right thalamocapsular   and gangliocapsular junctions.    There is diffuse cerebral volume loss with prominence of the sulci,   fissures, and cisternal spaces which is normal for the patient's age.   There is moderate patchy confluent periventricular white matter   hypoattenuation statistically compatible with microvascular changes given   calcific atherosclerotic disease of the intracranial arteries.    The paranasal sinuses and mastoid air cells remain clear. The calvarium   is intact. The orbits appear unremarkable.    IMPRESSION: Redemonstration of an evolving hematoma within the left   superior thalamus with mild surrounding vasogenic edema and mass effect.    No interval change when compared to the prior CT exam from earlier today.    ELI KERN M.D., ATTENDING RADIOLOGIST  This document has been electronically signed. Aug  6 2019 12:38PM        INVASIVE LINES: R femoral TLC, R femoral arterial line  INDWELLING CHAPARRO: Y  VTE PROPHYLAXIS: SCD   CAM ICU: -  CODE STATUS: FULL      CRITICAL CARE TIME SPENT: 65 minutes spent performing frequent bedside reassessments and augmenting plan of care to address problems of acute critical illness that pose high probability of life threatening deterioration and/or end organ damage/dysfunction and discussing goals of care with patient, non-inclusive of time spent on procedures performed.

## 2019-08-06 NOTE — PROCEDURE NOTE - ADDITIONAL PROCEDURE DETAILS
indications: shock, altered mental status, intracranial hemorrhage
indications: intracranial hemorrhage, altered mental status, shock, bradycardia

## 2019-08-06 NOTE — PROGRESS NOTE ADULT - SUBJECTIVE AND OBJECTIVE BOX
NEUROSURGERY PROGRESS NOTE:    Pt s/p Left thalamic hemorrhage, tx from [facility] w RLE weakness/ numbness that has resolved when evaluated today.  pt passed S/S eval    PMHx of CVA 3 years ago ago, prostate CA, CAD, HLD and s/p CABG/ cardio stents 3 years ago, prostatectomy        pt seen and states is at baseline today,   denied any new or worsening sensorimotor changes, no numbness to RLE    -headache     - Nausea / - Vomiting    MEDICATIONS  (STANDING):  atorvastatin 80 milliGRAM(s) Oral at bedtime  carvedilol 25 milliGRAM(s) Oral every 12 hours  chlorhexidine 4% Liquid 1 Application(s) Topical <User Schedule>  hydrALAZINE Injectable 10 milliGRAM(s) IV Push every 6 hours  niCARdipine Infusion 5 mG/Hr (25 mL/Hr) IV Continuous <Continuous>  norepinephrine Infusion 0.05 MICROgram(s)/kG/Min (10.031 mL/Hr) IV Continuous <Continuous>  sodium chloride 0.9% Bolus 500 milliLiter(s) IV Bolus once    MEDICATIONS  (PRN):  ALBUTerol/ipratropium for Nebulization 3 milliLiter(s) Nebulizer every 6 hours PRN Shortness of Breath and/or Wheezing  sodium chloride 0.9% lock flush 10 milliLiter(s) IV Push every 1 hour PRN Pre/post blood products, medications, blood draw, and to maintain line patency    Allergies    No Known Allergies    Intolerances      Vital Signs Last 24 Hrs  T(C): 36.9 (06 Aug 2019 08:00), Max: 36.9 (05 Aug 2019 19:47)  T(F): 98.4 (06 Aug 2019 08:00), Max: 98.4 (05 Aug 2019 19:47)  HR: 62 (06 Aug 2019 10:44) (44 - 96)  BP: 135/61 (06 Aug 2019 10:44) (58/41 - 204/93)  BP(mean): 88 (06 Aug 2019 10:44) (44 - 133)  RR: 12 (06 Aug 2019 10:44) (0 - 30)  SpO2: 100% (06 Aug 2019 10:44) (92% - 100%)        PHYSICAL EXAM:    GENERAL: NAD, well-groomed, well-developed, AAOx3 and very cooperative  HEAD:  Atraumatic, Normocephalic, no palpable step-off appreciated on palpation  EYES: b/l EOMI, R pupil 2mm and L pupil 2mm - reactive b/l to light, conjunctiva and sclera clear,   NERVOUS SYSTEM:  Alert & Oriented X3, speech is clear and fluent, no dysarthria appreciated. Good concentration & cooperative; Motor Strength 5/5 B/L upper and lower extremities, sensory is at baseline and symmetric b/l; No pronators or ankle clonus appreciated b/l, FS/ TML  SKIN: No rashes or lesions        LABS:                        11.7   7.93  )-----------( 269      ( 05 Aug 2019 18:52 )             36.1     08-06    145  |  110<H>  |  44.0<H>  ----------------------------<  177<H>  3.6   |  24.0  |  2.48<H>    Ca    8.7      06 Aug 2019 10:51  Phos  3.6     08-06  Mg     2.5     08-06    TPro  6.2<L>  /  Alb  3.5  /  TBili  0.6  /  DBili  x   /  AST  53<H>  /  ALT  37  /  AlkPhos  73  08-06    PT/INR - ( 05 Aug 2019 20:08 )   PT: 11.9 sec;   INR: 1.03 ratio         PTT - ( 05 Aug 2019 20:08 )  PTT:25.3 sec        RADIOLOGY & ADDITIONAL TESTS:  < from: CT Head No Cont (08.06.19 @ 06:37) >  EXAM:  CT BRAIN                          PROCEDURE DATE:  08/06/2019          INTERPRETATION:  EXAM:   CT Head Without Contrast     EXAM DATE/TIME:   8/6/2019 6:29 AM     CLINICAL HISTORY:   69 years old, male; Pain     TECHNIQUE: Imaging protocol: Computed tomography images of the head without contrast.   Coronal and sagittal reformatted images were created and reviewed.     COMPARISON:   CT HEAD 8/5/2019 9:33 PM     FINDINGS:   Brain: Focal acute hemorrhage of the high left thalamus measuring 1.5 x 1 x 1.4 cm is unchanged. Mild surrounding edema. Periventricular white matter hypoattenuation most likely reflects chronic small vessel ischemic change.   There is cerebral atrophy with compensatory dilation of the ventricles.   Ventricles: Compensatory dilation to overlying cerebral atrophy.   Bones/joints: Torus palatinus partially visualized. No acute fracture.   Sinuses: Mild bilateral maxillary sinus mucosal thickening. No fluid levels.   Mastoid air cells: Visualized mastoid air cells are well aerated. No mastoid effusion.   Soft tissues: Unremarkable.     IMPRESSION:   1. Focal acute hemorrhage of the high left thalamus measuring 1.5 x 1 cm is unchanged.   2. No acute intracranial findings.    A preliminaryreport was provided by Dr. Pham, [facility] radiologist.    MIGNON KERN   This document has been electronically signed. Aug  6 2019  9:26AM  < end of copied text >        Time Spent with patient/ education: > 30 mins

## 2019-08-06 NOTE — PROGRESS NOTE ADULT - SUBJECTIVE AND OBJECTIVE BOX
70 yo male, presented with L basal ganglia ICH with RLE weakness.  PMH of HTN, CVA, MI s/p 2 POP placement at [facility] (~3 years ago), Prostate Ca s/p radiation and prostatectomy (~2 years ago), former smoker (quit 3 years ago, 30 pack year).  Upon arrival to [facility], patient A&O x3, with minimal right lower extremity weakness.  Patient endorsed difficult walking, RLE weakness, no other complaints at that time. Admitted to MICU.   On admission, the patient was:  GCS:15.  ICH score: 0.    During rounds: Found to be bradycardic to 43 with hypotension MAP 47, blank staring and delayed response, while sitting in a chair. During assessment HR fluctuation noted going to 113 bpm. No focal neuro signs.  Transferred to bed, Trendelenburg position + NS bolus + also 1 gr of Keppra, immediately responded with stable HR 50-60s and MAP 67-75, became more interactive; O2sat was 92% the lowest - went to 100% with 4L NC. FS WNL.  EKG with junctional bradycardia, QTc 475, non-specific ST changes.  Patient denies any chest pain or discomfort, no dizziness or SOB, denies any HA, unusual smell/taste or visual changes. Denies any weakness.  Pads on, TLC and Allegra was placed by MICU team.    VITALS:  T(C): , Max: 36.9 (08-05-19 @ 19:47)  HR:  (44 - 96)  BP:  (58/41 - 204/93)  ABP:  (127/50 - 131/53)  RR:  (0 - 30)  SpO2:  (92% - 100%)  Wt(kg): --      08-05-19 @ 07:01  -  08-06-19 @ 07:00  --------------------------------------------------------  IN: 37.5 mL / OUT: 0 mL / NET: 37.5 mL    08-06-19 @ 07:01  -  08-06-19 @ 13:21  --------------------------------------------------------  IN: 1000 mL / OUT: 60 mL / NET: 940 mL      LABS: reviewed. Cardiac enzymes WNL, stable elevated Cr. K 3.6 other e-lytes WNL.  Recent imaging studies were reviewed: no acute changes, stable L BG ICH.    MEDICATIONS: reviewed.    EXAMINATION: by the end of the event. HR in 50-60, MAP 69-75. Osat 100%.  General:  calm, cooperative.  HEENT:  EOMI, PERRLA.  Neuro:  awake, alert, oriented x 3, follows commands, moves all extremities.  Cards:  S1S2 present, rhythmic bradycardia.  Respiratory:  no signs of respiratory distress, protects airways.  Abdomen:  soft  Extremities:  mild pitting edema BL feet.  Skin:  warm/dry

## 2019-08-06 NOTE — PROGRESS NOTE ADULT - ASSESSMENT
70 yo male PMHx HTN, CVA, MI s/p 2 POP placement at [facility] (~3 years ago), Prostate Ca s/p radiation and prostatectomy (~2 years ago), former smoker (quit 3 years ago, 30 pack year) transferred from [facility] with left thalamic ICH with surrounding vasogenic edema and mass effect.    Neuro  - Repeat CT head with stable thalamic hemorrhage, edema, and vasogenic edema  - Remains lethargic but with no new focal deficits.  - Continue q1h neuro checks  - No acute intervention planned per neurosurgery    Pulm  - Wean off nasal cannula as tolerated, maintaining SpO2 >90%    Cardiac  - Unclear etiology of acute events, now NSR and hemodynamically stable  - Weaned off Norepinephrine  - Troponin negative, will continue to trend  -     GI  - Passed SLP eval today, diet advanced    Renal  - ROSIO on ? CKD  - Monitor electrolytes closely, supplement electrolytes to maintain K >4, Mg >2, Phos >3 for optimal arrhythmia suppression  - Indwelling tucker placed during acute events for strict I&O monitoring    ID  - No active issues    Heme  - No pharmacologic DVT ppx due to active ICH  - SCDs

## 2019-08-07 LAB
ANION GAP SERPL CALC-SCNC: 11 MMOL/L — SIGNIFICANT CHANGE UP (ref 5–17)
BUN SERPL-MCNC: 47 MG/DL — HIGH (ref 8–20)
CALCIUM SERPL-MCNC: 9.2 MG/DL — SIGNIFICANT CHANGE UP (ref 8.6–10.2)
CHLORIDE SERPL-SCNC: 108 MMOL/L — HIGH (ref 98–107)
CO2 SERPL-SCNC: 23 MMOL/L — SIGNIFICANT CHANGE UP (ref 22–29)
CREAT SERPL-MCNC: 2.4 MG/DL — HIGH (ref 0.5–1.3)
GLUCOSE SERPL-MCNC: 134 MG/DL — HIGH (ref 70–115)
HCT VFR BLD CALC: 32.3 % — LOW (ref 39–50)
HGB BLD-MCNC: 10.5 G/DL — LOW (ref 13–17)
MAGNESIUM SERPL-MCNC: 2.6 MG/DL — SIGNIFICANT CHANGE UP (ref 1.6–2.6)
MCHC RBC-ENTMCNC: 32.4 PG — SIGNIFICANT CHANGE UP (ref 27–34)
MCHC RBC-ENTMCNC: 32.5 GM/DL — SIGNIFICANT CHANGE UP (ref 32–36)
MCV RBC AUTO: 99.7 FL — SIGNIFICANT CHANGE UP (ref 80–100)
PHOSPHATE SERPL-MCNC: 4.3 MG/DL — SIGNIFICANT CHANGE UP (ref 2.4–4.7)
PLATELET # BLD AUTO: 243 K/UL — SIGNIFICANT CHANGE UP (ref 150–400)
POTASSIUM SERPL-MCNC: 3.4 MMOL/L — LOW (ref 3.5–5.3)
POTASSIUM SERPL-SCNC: 3.4 MMOL/L — LOW (ref 3.5–5.3)
RBC # BLD: 3.24 M/UL — LOW (ref 4.2–5.8)
RBC # FLD: 13.4 % — SIGNIFICANT CHANGE UP (ref 10.3–14.5)
SODIUM SERPL-SCNC: 142 MMOL/L — SIGNIFICANT CHANGE UP (ref 135–145)
TROPONIN T SERPL-MCNC: <0.01 NG/ML — SIGNIFICANT CHANGE UP (ref 0–0.06)
WBC # BLD: 8.16 K/UL — SIGNIFICANT CHANGE UP (ref 3.8–10.5)
WBC # FLD AUTO: 8.16 K/UL — SIGNIFICANT CHANGE UP (ref 3.8–10.5)

## 2019-08-07 PROCEDURE — 93010 ELECTROCARDIOGRAM REPORT: CPT

## 2019-08-07 PROCEDURE — 99223 1ST HOSP IP/OBS HIGH 75: CPT

## 2019-08-07 PROCEDURE — 99232 SBSQ HOSP IP/OBS MODERATE 35: CPT

## 2019-08-07 PROCEDURE — 99233 SBSQ HOSP IP/OBS HIGH 50: CPT

## 2019-08-07 RX ORDER — CARVEDILOL PHOSPHATE 80 MG/1
1 CAPSULE, EXTENDED RELEASE ORAL
Qty: 0 | Refills: 0 | DISCHARGE

## 2019-08-07 RX ORDER — POTASSIUM CHLORIDE 20 MEQ
40 PACKET (EA) ORAL ONCE
Refills: 0 | Status: COMPLETED | OUTPATIENT
Start: 2019-08-07 | End: 2019-08-07

## 2019-08-07 RX ORDER — DOCUSATE SODIUM 100 MG
100 CAPSULE ORAL
Refills: 0 | Status: DISCONTINUED | OUTPATIENT
Start: 2019-08-07 | End: 2019-08-09

## 2019-08-07 RX ORDER — TRAZODONE HCL 50 MG
1 TABLET ORAL
Qty: 0 | Refills: 0 | DISCHARGE

## 2019-08-07 RX ORDER — AMLODIPINE BESYLATE 2.5 MG/1
5 TABLET ORAL DAILY
Refills: 0 | Status: DISCONTINUED | OUTPATIENT
Start: 2019-08-07 | End: 2019-08-09

## 2019-08-07 RX ORDER — FLUTICASONE PROPIONATE AND SALMETEROL 50; 250 UG/1; UG/1
1 POWDER ORAL; RESPIRATORY (INHALATION)
Qty: 0 | Refills: 0 | DISCHARGE

## 2019-08-07 RX ORDER — CARVEDILOL PHOSPHATE 80 MG/1
12.5 CAPSULE, EXTENDED RELEASE ORAL EVERY 12 HOURS
Refills: 0 | Status: DISCONTINUED | OUTPATIENT
Start: 2019-08-07 | End: 2019-08-08

## 2019-08-07 RX ORDER — FOLIC ACID 0.8 MG
1 TABLET ORAL
Qty: 0 | Refills: 0 | DISCHARGE

## 2019-08-07 RX ORDER — POTASSIUM CHLORIDE 20 MEQ
1 PACKET (EA) ORAL
Qty: 0 | Refills: 0 | DISCHARGE

## 2019-08-07 RX ORDER — ROSUVASTATIN CALCIUM 5 MG/1
1 TABLET ORAL
Qty: 0 | Refills: 0 | DISCHARGE

## 2019-08-07 RX ORDER — POTASSIUM CHLORIDE 20 MEQ
2 PACKET (EA) ORAL
Qty: 0 | Refills: 0 | DISCHARGE

## 2019-08-07 RX ADMIN — Medication 40 MILLIEQUIVALENT(S): at 10:06

## 2019-08-07 RX ADMIN — ATORVASTATIN CALCIUM 80 MILLIGRAM(S): 80 TABLET, FILM COATED ORAL at 21:03

## 2019-08-07 RX ADMIN — Medication 100 MILLIGRAM(S): at 17:02

## 2019-08-07 RX ADMIN — AMLODIPINE BESYLATE 5 MILLIGRAM(S): 2.5 TABLET ORAL at 11:16

## 2019-08-07 RX ADMIN — CHLORHEXIDINE GLUCONATE 1 APPLICATION(S): 213 SOLUTION TOPICAL at 10:17

## 2019-08-07 RX ADMIN — CARVEDILOL PHOSPHATE 12.5 MILLIGRAM(S): 80 CAPSULE, EXTENDED RELEASE ORAL at 17:02

## 2019-08-07 NOTE — CONSULT NOTE ADULT - SUBJECTIVE AND OBJECTIVE BOX
68 y/o male with h/o HTN, CAD, Prostate cancer, was transferred from [facility] [place] for left thalamic bleed. patient went to the ER because of lower ext weakness and difficulty to walk. symptomes improved . today, patient denies any lower ext weakness, headache, blurry vision or sensory affection.  CT surgery consult advised observation. repeat CT brain showed no change.     HPI:  70 yo male pmhx HTN, CVA, MI s/p 2 POP placement at [facility] (~3 years ago), Prostate Ca s/p radiation and prostatectomy (~2 years ago), former smoker (quit 3 years ago, 30 pack year) transferred from [facility] with ICH.  Patient endorses that yesterday afternoon at ~1700 he began to experience right leg weakness and difficulty walking.  Patient presented to [facility] [place] [facility] with persistent symptoms and found to have left thalmic ICH prompting transfer to [facility].  Upon arrival to [facility], patient A&O x3, with minimal right lower extremity weakness.  Patient endorses difficult walking, RLE weakness, no other complaints at this time. Admit to MICU. (05 Aug 2019 21:50)        PAST MEDICAL & SURGICAL HISTORY:  Coronary artery disease with hx of myocardial infarct w/o hx of CABG: s/p 2 stents  Prostate cancer: s/p radiation and prostatectomy  Cholesterol depletion  CVA (cerebral vascular accident)  HTN (hypertension)  History of prostate surgery        MEDICATIONS  (STANDING):  amLODIPine   Tablet 5 milliGRAM(s) Oral daily  atorvastatin 80 milliGRAM(s) Oral at bedtime  carvedilol 12.5 milliGRAM(s) Oral every 12 hours  chlorhexidine 4% Liquid 1 Application(s) Topical <User Schedule>    MEDICATIONS  (PRN):  ALBUTerol/ipratropium for Nebulization 3 milliLiter(s) Nebulizer every 6 hours PRN Shortness of Breath and/or Wheezing  sodium chloride 0.9% lock flush 10 milliLiter(s) IV Push every 1 hour PRN Pre/post blood products, medications, blood draw, and to maintain line patency        Allergies    No Known Allergies    Intolerances        SOCIAL HISTORY:    FAMILY HISTORY:      Vital Signs Last 24 Hrs  T(C): 36.9 (07 Aug 2019 15:00), Max: 37.4 (06 Aug 2019 20:00)  T(F): 98.4 (07 Aug 2019 15:00), Max: 99.3 (06 Aug 2019 20:00)  HR: 66 (07 Aug 2019 15:00) (54 - 71)  BP: 136/71 (07 Aug 2019 15:00) (116/56 - 179/82)  BP(mean): 97 (07 Aug 2019 15:00) (75 - 118)  RR: 16 (07 Aug 2019 15:00) (10 - 31)  SpO2: 98% (07 Aug 2019 15:00) (94% - 99%)  Physical Exam:   GEN: NAD, dry mouth  HEENT: EOMI, PERRL  Neck: supple. no palpable lymph nodes  CV: S1 S2, RRR. no murmur  Lung: CTA bilaterally  Abd: soft NT ND +BS  Ext: no c/c/e. no calf tenderness  Neuro: no focal neuro deficit  Skin: no rash      LABS:                          10.5   8.16  )-----------( 243      ( 07 Aug 2019 05:07 )             32.3     08-07    142  |  108<H>  |  47.0<H>  ----------------------------<  134<H>  3.4<L>   |  23.0  |  2.40<H>    Ca    9.2      07 Aug 2019 05:07  Phos  4.3     08-07  Mg     2.6     08-07    TPro  6.2<L>  /  Alb  3.5  /  TBili  0.6  /  DBili  x   /  AST  53<H>  /  ALT  37  /  AlkPhos  73  08-06    PT/INR - ( 05 Aug 2019 20:08 )   PT: 11.9 sec;   INR: 1.03 ratio         PTT - ( 05 Aug 2019 20:08 )  PTT:25.3 sec      Assessment and Plan: 70 y/o male with h/o HTN, CAD, Prostate cancer, [place] [facility] [place]. patient went to the ER because of lower ext weakness and difficulty to walk. symptomes improved . today, patient denies any lower ext weakness, headache, blurry vision or sensory affection.  CT surgery consult advised observation. repeat CT brain showed no change. patient atribute the ICH to straining 2nd to constipation    HPI:  68 yo male pmhx HTN, CVA, [facility] s/[facility] ([facility] ago), Prostate Ca s/p radiation and prostatectomy (~2 years ago), former smoker (quit 3 years ago, 30 pack year) transferred from [facility] with ICH.  Patient endorses that yesterday afternoon at ~1700 he began to experience right leg weakness and difficulty walking.  [place] [facility] [place] and found to have left thalmic ICH prompting transfer to [facility].  Upon arrival to [facility], patient A&O x3, with minimal right lower extremity weakness.  Patient endorses difficult walking, RLE weakness, no other complaints at this time. Admit to MICU. (05 Aug 2019 21:50)        PAST MEDICAL & SURGICAL HISTORY:  Coronary artery disease with hx of myocardial infarct w/o hx of CABG: s/p 2 stents  Prostate cancer: s/p radiation and prostatectomy  Cholesterol depletion  CVA (cerebral vascular accident)  HTN (hypertension)  History of prostate surgery        MEDICATIONS  (STANDING):  amLODIPine   Tablet 5 milliGRAM(s) Oral daily  atorvastatin 80 milliGRAM(s) Oral at bedtime  carvedilol 12.5 milliGRAM(s) Oral every 12 hours  chlorhexidine 4% Liquid 1 Application(s) Topical <User Schedule>    MEDICATIONS  (PRN):  ALBUTerol/ipratropium for Nebulization 3 milliLiter(s) Nebulizer every 6 hours PRN Shortness of Breath and/or Wheezing  sodium chloride 0.9% lock flush 10 milliLiter(s) IV Push every 1 hour PRN Pre/post blood products, medications, blood draw, and to maintain line patency        Allergies    No Known Allergies    Intolerances        SOCIAL HISTORY:    FAMILY HISTORY:      Vital Signs Last 24 Hrs  T(C): 36.9 (07 Aug 2019 15:00), Max: 37.4 (06 Aug 2019 20:00)  T(F): 98.4 (07 Aug 2019 15:00), Max: 99.3 (06 Aug 2019 20:00)  HR: 66 (07 Aug 2019 15:00) (54 - 71)  BP: 136/71 (07 Aug 2019 15:00) (116/56 - 179/82)  BP(mean): 97 (07 Aug 2019 15:00) (75 - 118)  RR: 16 (07 Aug 2019 15:00) (10 - 31)  SpO2: 98% (07 Aug 2019 15:00) (94% - 99%)  Physical Exam:   GEN: NAD, dry mouth  HEENT: EOMI, PERRL  Neck: supple. no palpable lymph nodes  CV: S1 S2, RRR. no murmur  Lung: CTA bilaterally  Abd: soft NT ND +BS  Ext: no c/c/e. no calf tenderness  Neuro: no focal neuro deficit  Skin: no rash      LABS:                          10.5   8.16  )-----------( 243      ( 07 Aug 2019 05:07 )             32.3     08-07    142  |  108<H>  |  47.0<H>  ----------------------------<  134<H>  3.4<L>   |  23.0  |  2.40<H>    Ca    9.2      07 Aug 2019 05:07  Phos  4.3     08-07  Mg     2.6     08-07    TPro  6.2<L>  /  Alb  3.5  /  TBili  0.6  /  DBili  x   /  AST  53<H>  /  ALT  37  /  AlkPhos  73  08-06    PT/INR - ( 05 Aug 2019 20:08 )   PT: 11.9 sec;   INR: 1.03 ratio         PTT - ( 05 Aug 2019 20:08 )  PTT:25.3 sec      Assessment and Plan:

## 2019-08-07 NOTE — PROGRESS NOTE ADULT - ASSESSMENT
69 year old male with PMH of HTN, CVA, MI s/p 2 POP placement at [place] (~3 years ago), Prostate Ca s/p radiation and prostatectomy (~2 years ago), former smoker (quit 3 years ago, 30 pack year).  P/w right lower extremity weakness and difficult walking, RLE weakness.  Admitted this admission with thalamic hemorrhage.        PLAN:  NEURO:   Q1H NEURO CKS  HOB UP > 30 DEGREES  NO ANTI COAGS, NO ANTI PLATELETS  NO NARCOTICS NO SEDATION  CTA BRAIN WHEN CR/BUN ALLOWS  Goal SBP<150  Will sign off for now.  Stable from Neurosurgical point of view, reconsult as needed.      Assessment:  Please Check When Present   []  GCS  E   V  M     Heart Failure: []Acute, [] acute on chronic , []chronic  Heart Failure:  [] Diastolic (HFpEF), [] Systolic (HFrEF), []Combined (HFpEF and HFrEF), [] RHF, [] Pulm HTN, [] Other    [] ROSIO, [] ATN, [] AIN, [] other  [] CKD1, [] CKD2, [] CKD 3, [] CKD 4, [] CKD 5, []ESRD    Encephalopathy: [] Metabolic, [] Hepatic, [] toxic, [] Neurological, [] Other    Abnormal Nurtitional Status: [] malnurtition (see nutrition note), [ ]underweight: BMI < 19, [] morbid obesity: BMI >40, [] Cachexia    [] Sepsis  [] hypovolemic shock,[] cardiogenic shock, [] hemorrhagic shock, [] neuogenic shock  [] Acute Respiratory Failure  []Cerebral edema, [] Brain compression/ herniation,   [] Functional quadriplegia  [] Acute blood loss anemia    Will discuss plan with Dr. Lafleur 69 year old male with PMH of HTN, CVA, MI s/p 2 POP placement at [place] (~3 years ago), Prostate Ca s/p radiation and prostatectomy (~2 years ago), former smoker (quit 3 years ago, 30 pack year).  P/w right lower extremity weakness and difficult walking, RLE weakness.  Admitted this admission with thalamic hemorrhage.        PLAN:  NEURO:   -continue neuro checks  -no anticoagulants/ antiplatelets for now  -CTA brain when renal function allows  -no sedation  -Goal SBP<150  -PT/OT  -Will sign off for now.  Stable from Neurosurgical point of view, reconsult as needed.      Assessment:  Please Check When Present   []  GCS  E   V  M     Heart Failure: []Acute, [] acute on chronic , []chronic  Heart Failure:  [] Diastolic (HFpEF), [] Systolic (HFrEF), []Combined (HFpEF and HFrEF), [] RHF, [] Pulm HTN, [] Other    [] ROSIO, [] ATN, [] AIN, [] other  [] CKD1, [] CKD2, [] CKD 3, [] CKD 4, [] CKD 5, []ESRD    Encephalopathy: [] Metabolic, [] Hepatic, [] toxic, [] Neurological, [] Other    Abnormal Nurtitional Status: [] malnurtition (see nutrition note), [ ]underweight: BMI < 19, [] morbid obesity: BMI >40, [] Cachexia    [] Sepsis  [] hypovolemic shock,[] cardiogenic shock, [] hemorrhagic shock, [] neuogenic shock  [] Acute Respiratory Failure  []Cerebral edema, [] Brain compression/ herniation,   [] Functional quadriplegia  [] Acute blood loss anemia    Will discuss plan with Dr. Lafleur 69 year old male with PMH of HTN, CVA, MI s/p 2 POP placement at [place] (~3 years ago), Prostate Ca s/p radiation and prostatectomy (~2 years ago), former smoker (quit 3 years ago, 30 pack year).  P/w right lower extremity weakness and difficult walking, RLE weakness.  Admitted this admission with thalamic hemorrhage.        PLAN:  NEURO:   -continue neuro checks  -no anticoagulants/ antiplatelets for now  -CTA brain when renal function allows  -no sedation  -Goal SBP<150  -PT/OT  -Can restart ASA 81mg  in 7 days from date of injury, or after 72 hours from date of injury if deemed to be necessary by Cardiology  -Will sign off for now.  Stable from Neurosurgical point of view, reconsult as needed.      Assessment:  Please Check When Present   []  GCS  E   V  M     Heart Failure: []Acute, [] acute on chronic , []chronic  Heart Failure:  [] Diastolic (HFpEF), [] Systolic (HFrEF), []Combined (HFpEF and HFrEF), [] RHF, [] Pulm HTN, [] Other    [] ROSIO, [] ATN, [] AIN, [] other  [] CKD1, [] CKD2, [] CKD 3, [] CKD 4, [] CKD 5, []ESRD    Encephalopathy: [] Metabolic, [] Hepatic, [] toxic, [] Neurological, [] Other    Abnormal Nurtitional Status: [] malnurtition (see nutrition note), [ ]underweight: BMI < 19, [] morbid obesity: BMI >40, [] Cachexia    [] Sepsis  [] hypovolemic shock,[] cardiogenic shock, [] hemorrhagic shock, [] neuogenic shock  [] Acute Respiratory Failure  []Cerebral edema, [] Brain compression/ herniation,   [] Functional quadriplegia  [] Acute blood loss anemia    Will discuss plan with Dr. Lafleur

## 2019-08-07 NOTE — PROGRESS NOTE ADULT - SUBJECTIVE AND OBJECTIVE BOX
70 yo male, presented with L basal ganglia ICH with RLE weakness.  PMH of HTN, CVA, MI s/p 2 POP placement at [place] (~3 years ago), Prostate Ca s/p radiation and prostatectomy (~2 years ago), former smoker (quit 3 years ago, 30 pack year).  Upon arrival to [facility], patient A&O x3, with minimal right lower extremity weakness.  Patient endorsed difficult walking, RLE weakness, no other complaints at that time. Admitted to MICU.   On admission, the patient was:  GCS:15.  ICH score: 0.    Hypotensive, bradycardic 08/06. Responded to fluids and Trendelenburg.  No acute o/n/ events.    LABS: reviewed.   Recent imaging studies were reviewed.    MEDICATIONS: reviewed.    EXAMINATION: .  General:  calm, cooperative, lying comfortable in bed.  HEENT:  EOMI, PERRLA.  Neuro:  awake, alert, oriented x 3, follows commands, moves all extremities.  Cards:  S1S2 present, HR 56, MAP > 70.  Respiratory:  no signs of respiratory distress, protects airways.  Abdomen:  soft  Extremities:  mild pitting edema BL feet.  Skin:  warm/dry

## 2019-08-07 NOTE — DIETITIAN INITIAL EVALUATION ADULT. - PERTINENT LABORATORY DATA
08-07 Na142 mmol/L Glu 134 mg/dL<H> K+ 3.4 mmol/L<L> Cr  2.40 mg/dL<H> BUN 47.0 mg/dL<H> Phos 4.3 mg/dL Alb n/a   PAB n/a     n/a  HgbA1C

## 2019-08-07 NOTE — PROGRESS NOTE ADULT - ASSESSMENT
70 yo male with hx of CAD s/p PCI, HTN, prior CVA, presented to [facility] with right sided weakness, transferred to [facility] due to left thalamic ICH, hypertensive emergency.   Patient now doing quite well, no overt neurologic deficits, awaiting downgrade.

## 2019-08-07 NOTE — PROGRESS NOTE ADULT - SUBJECTIVE AND OBJECTIVE BOX
SUBJECTIVE: Patient seen and examined at bedside.  Denies any new complaints.  No headaches, no nausea/ vomiting.      CHIEF COMPLAINT: headaches    OVERNIGHT EVENTS: episode of hypotension when OOB yesterday    Vital Signs Last 24 Hrs  T(C): 36.6 (07 Aug 2019 09:00), Max: 37.4 (06 Aug 2019 20:00)  T(F): 97.9 (07 Aug 2019 09:00), Max: 99.3 (06 Aug 2019 20:00)  HR: 59 (07 Aug 2019 09:00) (50 - 71)  BP: 129/62 (07 Aug 2019 09:00) (87/53 - 162/78)  BP(mean): 89 (07 Aug 2019 09:00) (67 - 108)  RR: 21 (07 Aug 2019 09:00) (10 - 31)  SpO2: 96% (07 Aug 2019 09:00) (94% - 100%)    PHYSICAL EXAM:    General: No Acute Distress     Neurological: Alert and oriented to self, place and year.  Speech clear, follows commands, CM.  No drift.     Pulmonary: Clear to Auscultation, No Rales, No Rhonchi, No Wheezes     Cardiovascular: S1, S2, Regular Rate and Rhythm     Gastrointestinal: Soft, Nontender, Nondistended     LABS:                        10.5   8.16  )-----------( 243      ( 07 Aug 2019 05:07 )             32.3    08-07    142  |  108<H>  |  47.0<H>  ----------------------------<  134<H>  3.4<L>   |  23.0  |  2.40<H>    Ca    9.2      07 Aug 2019 05:07  Phos  4.3     08-07  Mg     2.6     08-07    TPro  6.2<L>  /  Alb  3.5  /  TBili  0.6  /  DBili  x   /  AST  53<H>  /  ALT  37  /  AlkPhos  73  08-06  PT/INR - ( 05 Aug 2019 20:08 )   PT: 11.9 sec;   INR: 1.03 ratio         PTT - ( 05 Aug 2019 20:08 )  PTT:25.3 sec      08-06 @ 07:01  -  08-07 @ 07:00  < from: CT Head No Cont (08.06.19 @ 06:37) >  1. Focal acute hemorrhage of the high left thalamus measuring 1.5 x 1 cm   is   unchanged.   2. No acute intracranial findings.    < end of copied text >  --------------------------------------------------------  IN: 1440 mL / OUT: 1170 mL / NET: 270 mL    08-07 @ 07:01  -  08-07 @ 10:14  --------------------------------------------------------  IN: 0 mL / OUT: 60 mL / NET: -60 mL    MEDICATIONS:  Antibiotics:    Neuro:    Cardiac:  hydrALAZINE Injectable 10 milliGRAM(s) IV Push every 4 hours PRN SBP >160    Pulm:  ALBUTerol/ipratropium for Nebulization 3 milliLiter(s) Nebulizer every 6 hours PRN Shortness of Breath and/or Wheezing    GI/:    Other:   atorvastatin 80 milliGRAM(s) Oral at bedtime  chlorhexidine 4% Liquid 1 Application(s) Topical <User Schedule>  sodium chloride 0.9% lock flush 10 milliLiter(s) IV Push every 1 hour PRN Pre/post blood products, medications, blood draw, and to maintain line patency    DIET: [] Regular [] CCD [] Renal [] Puree [] Dysphagia [] Tube Feeds:     IMAGING: < from: CT Head No Cont (08.06.19 @ 11:38) >  : Redemonstration of an evolving hematoma within the left   superior thalamus with mild surrounding vasogenic edema and mass effect.    No interval change when compared to the prior CT exam from earlier today.    < end of copied text >    < from: CT Head No Cont (08.05.19 @ 21:57) >  Parenchymal volume loss is noted with prominent ventricles and sulci.   Chronic microvascular ischemic changes are identified. Tiny old lacunar   infarcts are seen in the left lentiform nucleus. A small hypodensity is   seen in the right frontal nucleus which could represent a lacunar infarct   of uncertain age versus focal chronic microvascular ischemic change. No   acute territorial infarct is demonstrated.    A left thalamic intraparenchymal hematoma measures 1.4 x 0.9 cm with mild   surrounding vasogenic edema.    Evaluation of the osseous structures with the appropriate window appears   unremarkable. Vascular calcifications are noted. The visualized paranasal   sinuses and mastoid air cells are clear.    < end of copied text >

## 2019-08-07 NOTE — DIETITIAN INITIAL EVALUATION ADULT. - OTHER INFO
Pt is a 69 year old male found to have a found to have left thalamic ICH. Pt awake and alert. Pt reports eating well PTA, has had no recent wt changes. Reports trying to follow a low sugar diet. declined diet education at this time. Food preferences obtained.

## 2019-08-07 NOTE — PROGRESS NOTE ADULT - SUBJECTIVE AND OBJECTIVE BOX
INTERVAL HPI/OVERNIGHT EVENTS: No major events.    PHYSICAL EXAM:  GENERAL: NAD   HEAD:  Atraumatic, normocephalic  EYES: EOMI, PERRL, sclera and conjunctiva clear  ENMT:  MMM, No oropharyngeal erythema or exudates  NECK:  Supple, normal appearance, trachea midline, large circumference neck  NERVOUS SYSTEM:  Alert & Oriented X3, Symmetric strength in all extremities    CHEST/LUNG: Bilateral air entry, no chest deformity,  HEART: Regular rate, regular rhythm; No murmurs, rubs, or gallops  ABDOMEN: Obese, Soft, Nontender, Nondistended;   EXTREMITIES:   no clubbing, no cyanosis.  LYMPH:  No lymphadenopathy noted  SKIN:   good capillary refill  PSYCH: appropriate affect and behavior  Femoral art line and TLC present    RADIOLOGY personally reviewed: CT brain with stable left thalamic hemorrhage.  GOALS OF CARE DISCUSSION WITH PATIENT/FAMILY/PROXY: patient updated on plan and prognosis  --------------------------------------------------------------------------------------------------------------------------------------------------------------  On Admission  08-05-19 (2d)  HPI:  70 yo male pmhx HTN, CVA, MI s/p 2 POP placement at [place] (~3 years ago), Prostate Ca s/p radiation and prostatectomy (~2 years ago), former smoker (quit 3 years ago, 30 pack year) transferred from [facility] with ICH.  Patient endorses that yesterday afternoon at ~1700 he began to experience right leg weakness and difficulty walking.  Patient presented to [facility] [place] with persistent symptoms and found to have left thalmic ICH prompting transfer to [facility].  Upon arrival to [facility], patient A&O x3, with minimal right lower extremity weakness.  Patient endorses difficult walking, RLE weakness, no other complaints at this time. Admit to MICU. (05 Aug 2019 21:50)    PAST MEDICAL & SURGICAL HISTORY:  Coronary artery disease with hx of myocardial infarct w/o hx of CABG: s/p 2 stents  Prostate cancer: s/p radiation and prostatectomy  Cholesterol depletion  CVA (cerebral vascular accident)  HTN (hypertension)  History of prostate surgery      Antimicrobial:    Cardiovascular:  amLODIPine   Tablet 5 milliGRAM(s) Oral daily  hydrALAZINE Injectable 10 milliGRAM(s) IV Push every 4 hours PRN    Pulmonary:  ALBUTerol/ipratropium for Nebulization 3 milliLiter(s) Nebulizer every 6 hours PRN    Hematalogic:    Other:  atorvastatin 80 milliGRAM(s) Oral at bedtime  chlorhexidine 4% Liquid 1 Application(s) Topical <User Schedule>  sodium chloride 0.9% lock flush 10 milliLiter(s) IV Push every 1 hour PRN      Drug Dosing Weight  Height (cm): 180.3 (05 Aug 2019 22:30)  Weight (kg): 107 (05 Aug 2019 22:30)  BMI (kg/m2): 32.9 (05 Aug 2019 22:30)  BSA (m2): 2.26 (05 Aug 2019 22:30)    T(C): 36.9 (08-07-19 @ 12:00), Max: 37.4 (08-06-19 @ 20:00)  HR: 58 (08-07-19 @ 12:00)  BP: 118/61 (08-07-19 @ 12:00)  BP(mean): 84 (08-07-19 @ 12:00)  ABP: 115/78 (08-07-19 @ 12:00)  ABP(mean): 112 (08-07-19 @ 12:00)  RR: 19 (08-07-19 @ 12:00)  SpO2: 98% (08-07-19 @ 12:00)    ABG - ( 06 Aug 2019 14:09 )  pH, Arterial: 7.40  pH, Blood: x     /  pCO2: 38    /  pO2: 82    / HCO3: 24    / Base Excess: -0.8  /  SaO2: 97                    08-06 @ 07:01  -  08-07 @ 07:00  --------------------------------------------------------  IN: 1440 mL / OUT: 1170 mL / NET: 270 mL              LABS:  CBC Full  -  ( 07 Aug 2019 05:07 )  WBC Count : 8.16 K/uL  RBC Count : 3.24 M/uL  Hemoglobin : 10.5 g/dL  Hematocrit : 32.3 %  Platelet Count - Automated : 243 K/uL  Mean Cell Volume : 99.7 fl  Mean Cell Hemoglobin : 32.4 pg  Mean Cell Hemoglobin Concentration : 32.5 gm/dL  Auto Neutrophil # : x  Auto Lymphocyte # : x  Auto Monocyte # : x  Auto Eosinophil # : x  Auto Basophil # : x  Auto Neutrophil % : x  Auto Lymphocyte % : x  Auto Monocyte % : x  Auto Eosinophil % : x  Auto Basophil % : x    08-07    142  |  108<H>  |  47.0<H>  ----------------------------<  134<H>  3.4<L>   |  23.0  |  2.40<H>    Ca    9.2      07 Aug 2019 05:07  Phos  4.3     08-07  Mg     2.6     08-07    TPro  6.2<L>  /  Alb  3.5  /  TBili  0.6  /  DBili  x   /  AST  53<H>  /  ALT  37  /  AlkPhos  73  08-06    PT/INR - ( 05 Aug 2019 20:08 )   PT: 11.9 sec;   INR: 1.03 ratio         PTT - ( 05 Aug 2019 20:08 )  PTT:25.3 sec

## 2019-08-07 NOTE — PROGRESS NOTE ADULT - ATTENDING COMMENTS
NSGY Attg:    see above    patient seen and examined    agree with plan as documented
NSGY Attg:    see above    patient seen; central line placement in progress    repeat CT stable    agree with plan as above; additional CT pending given change in exam earlier this am

## 2019-08-07 NOTE — CONSULT NOTE ADULT - ASSESSMENT
70 y/o male with ICH stable, HTN, CAD, constipation    ·	colace.   ·	f/u with Neurosurgery  ·	continue Amlodipine and carvedilol  ·	continue Atorvastatin

## 2019-08-07 NOTE — PROGRESS NOTE ADULT - ASSESSMENT
68 yo male with L basal ganglia ICH, score 0, radiographically and neurologically stable now. Etiology - likely hypertension  Bradycardia, junctional rhythm; hypotension improved with hydration and Trendelenburg. Etiology - possible contribution of acute cerebral injury and hypovolemia + vasovagal.   Extensive cardiac history MI s/p 2 POP placement at [place] (~3 years ago). HTN. Remote CVA.  Former smoker (quit 3 years ago, 30 pack year).  Prostate Ca s/p radiation and prostatectomy (~2 years ago).    Plan:  -neurochecks q 4hr ; is being downgraded today   -antiplatelets or AC - as explained by NSGy  -ECHO WNL  -please, monitor BP, SBP < 150.

## 2019-08-08 LAB
ALBUMIN SERPL ELPH-MCNC: 3.8 G/DL — SIGNIFICANT CHANGE UP (ref 3.3–5.2)
ALP SERPL-CCNC: 90 U/L — SIGNIFICANT CHANGE UP (ref 40–120)
ALT FLD-CCNC: 33 U/L — SIGNIFICANT CHANGE UP
ANION GAP SERPL CALC-SCNC: 12 MMOL/L — SIGNIFICANT CHANGE UP (ref 5–17)
AST SERPL-CCNC: 35 U/L — SIGNIFICANT CHANGE UP
BILIRUB SERPL-MCNC: 0.5 MG/DL — SIGNIFICANT CHANGE UP (ref 0.4–2)
BUN SERPL-MCNC: 47 MG/DL — HIGH (ref 8–20)
CALCIUM SERPL-MCNC: 9.4 MG/DL — SIGNIFICANT CHANGE UP (ref 8.6–10.2)
CHLORIDE SERPL-SCNC: 106 MMOL/L — SIGNIFICANT CHANGE UP (ref 98–107)
CO2 SERPL-SCNC: 24 MMOL/L — SIGNIFICANT CHANGE UP (ref 22–29)
CREAT SERPL-MCNC: 2.41 MG/DL — HIGH (ref 0.5–1.3)
GLUCOSE SERPL-MCNC: 127 MG/DL — HIGH (ref 70–115)
HCT VFR BLD CALC: 33.8 % — LOW (ref 39–50)
HGB BLD-MCNC: 10.7 G/DL — LOW (ref 13–17)
MAGNESIUM SERPL-MCNC: 2.7 MG/DL — HIGH (ref 1.6–2.6)
MCHC RBC-ENTMCNC: 31.7 GM/DL — LOW (ref 32–36)
MCHC RBC-ENTMCNC: 32.2 PG — SIGNIFICANT CHANGE UP (ref 27–34)
MCV RBC AUTO: 101.8 FL — HIGH (ref 80–100)
PHOSPHATE SERPL-MCNC: 4 MG/DL — SIGNIFICANT CHANGE UP (ref 2.4–4.7)
PLATELET # BLD AUTO: 246 K/UL — SIGNIFICANT CHANGE UP (ref 150–400)
POTASSIUM SERPL-MCNC: 4.5 MMOL/L — SIGNIFICANT CHANGE UP (ref 3.5–5.3)
POTASSIUM SERPL-SCNC: 4.5 MMOL/L — SIGNIFICANT CHANGE UP (ref 3.5–5.3)
PROT SERPL-MCNC: 6.5 G/DL — LOW (ref 6.6–8.7)
RBC # BLD: 3.32 M/UL — LOW (ref 4.2–5.8)
RBC # FLD: 13.7 % — SIGNIFICANT CHANGE UP (ref 10.3–14.5)
SODIUM SERPL-SCNC: 142 MMOL/L — SIGNIFICANT CHANGE UP (ref 135–145)
WBC # BLD: 7.75 K/UL — SIGNIFICANT CHANGE UP (ref 3.8–10.5)
WBC # FLD AUTO: 7.75 K/UL — SIGNIFICANT CHANGE UP (ref 3.8–10.5)

## 2019-08-08 PROCEDURE — 99233 SBSQ HOSP IP/OBS HIGH 50: CPT

## 2019-08-08 RX ORDER — CARVEDILOL PHOSPHATE 80 MG/1
6.25 CAPSULE, EXTENDED RELEASE ORAL EVERY 12 HOURS
Refills: 0 | Status: DISCONTINUED | OUTPATIENT
Start: 2019-08-08 | End: 2019-08-09

## 2019-08-08 RX ORDER — SENNA PLUS 8.6 MG/1
2 TABLET ORAL AT BEDTIME
Refills: 0 | Status: DISCONTINUED | OUTPATIENT
Start: 2019-08-08 | End: 2019-08-09

## 2019-08-08 RX ORDER — ACETAMINOPHEN 500 MG
650 TABLET ORAL EVERY 6 HOURS
Refills: 0 | Status: DISCONTINUED | OUTPATIENT
Start: 2019-08-08 | End: 2019-08-09

## 2019-08-08 RX ORDER — ONDANSETRON 8 MG/1
4 TABLET, FILM COATED ORAL EVERY 6 HOURS
Refills: 0 | Status: DISCONTINUED | OUTPATIENT
Start: 2019-08-08 | End: 2019-08-09

## 2019-08-08 RX ADMIN — ATORVASTATIN CALCIUM 80 MILLIGRAM(S): 80 TABLET, FILM COATED ORAL at 21:21

## 2019-08-08 RX ADMIN — CARVEDILOL PHOSPHATE 12.5 MILLIGRAM(S): 80 CAPSULE, EXTENDED RELEASE ORAL at 09:38

## 2019-08-08 RX ADMIN — AMLODIPINE BESYLATE 5 MILLIGRAM(S): 2.5 TABLET ORAL at 05:16

## 2019-08-08 RX ADMIN — CARVEDILOL PHOSPHATE 6.25 MILLIGRAM(S): 80 CAPSULE, EXTENDED RELEASE ORAL at 18:39

## 2019-08-08 RX ADMIN — Medication 100 MILLIGRAM(S): at 18:39

## 2019-08-08 RX ADMIN — Medication 100 MILLIGRAM(S): at 05:16

## 2019-08-08 RX ADMIN — CHLORHEXIDINE GLUCONATE 1 APPLICATION(S): 213 SOLUTION TOPICAL at 12:00

## 2019-08-08 RX ADMIN — SENNA PLUS 2 TABLET(S): 8.6 TABLET ORAL at 21:21

## 2019-08-08 NOTE — PROGRESS NOTE ADULT - ASSESSMENT
69 years old male with,    1) ICH - left superior thalamus  - Stable  - Neuro checks  - Goal SBP < 150  - No antiplatelets for now. Restart Aspirin in 7 days from date of injury or within 72 hours from date of injury if deemed necessary by cardiology  - PT  - Neurosurgery input appreciated  2) CAD s/p stents (2 years ago)  - Antiplatelets on hold. Restart Aspirin in 7 days from date of injury or within 72 hours from date of injury if deemed necessary by cardiology.  - Continue Coreg (decreased to 6.25 mg) and Amlodipine 5 mg  3) HTN  - Continue Amlodipine 5 mg  - Decreased Coreg to 6.25 mg BID due to bradycardia  4) CKD 4  - Stable   - Avoid nephrotoxic medications  - Outpatient follow up with Nephrology  5) History of Prostate Cancer  - Outpatient follow up with Urology and Oncology  DVT Prophylaxis -- IPC    Dispo: Home with Outpatient PT in 1-2 days.

## 2019-08-08 NOTE — PROGRESS NOTE ADULT - SUBJECTIVE AND OBJECTIVE BOX
Nontraumatic intracerebral hemorrhage    HPI:  70 yo male pmhx HTN, CVA, MI s/p 2 POP placement at [facility] (~3 years ago), Prostate Ca s/p radiation and prostatectomy (~2 years ago), former smoker (quit 3 years ago, 30 pack year) transferred from [facility] with ICH.  Patient endorses that yesterday afternoon at ~1700 he began to experience right leg weakness and difficulty walking.  Patient presented to [facility] with persistent symptoms and found to have left thalmic ICH prompting transfer to [facility].  Upon arrival to [facility], patient A&O x3, with minimal right lower extremity weakness.  Patient endorses difficult walking, RLE weakness, no other complaints at this time. Admit to MICU. (05 Aug 2019 21:50)    Interval History:  Patient was seen and examined at bedside around 10 am. Complaining of weakness in RLE. Denies paresthesia, headache, dizziness, visual symptoms, nausea, vomiting, abdominal pain, chest pain, palpitations or shortness of breath.  Tele reviewed, HR in upper 40s.     ROS:  As per interval history otherwise unremarkable.    PHYSICAL EXAM:  Vital Signs   T(C): 36.8 (08 Aug 2019 10:04), Max: 36.9 (08 Aug 2019 05:08)  T(F): 98.3 (08 Aug 2019 10:04), Max: 98.5 (08 Aug 2019 05:08)  HR: 73 (08 Aug 2019 10:04) (59 - 73)  BP: 102/70 (08 Aug 2019 10:04) (102/70 - 147/65)  BP(mean): 93 (07 Aug 2019 19:00) (87 - 103)  RR: 17 (08 Aug 2019 10:04) (16 - 28)  SpO2: 94% (08 Aug 2019 10:04) (94% - 98%)  General: Well developed. Well nourished. No acute distress  HEENT: PERRLA. EOMI. Clear conjunctivae. Moist mucus membrane  Neck: Supple.   Chest: CTA bilaterally - no wheezing, rales or rhonchi.   Heart: Normal S1 & S2. RRR.    Abdomen: Obese. Soft. Non-tender. + BS  Ext: No pedal edema. No calf tenderness   Neuro: AAO x 3. CN II-XII grossly WNL. No speech disorder. Motor 5/5 in RUE, LUE & LLE. 3-4/5 in RLE. Sensation intact. Reflexes 2+. Cerebellar signs intact.   Skin: Warm and Dry  Psychiatry: Normal mood and affect    I&O's Summary    07 Aug 2019 07:01  -  08 Aug 2019 07:00  --------------------------------------------------------  IN: 1680 mL / OUT: 510 mL / NET: 1170 mL    08 Aug 2019 07:01  -  08 Aug 2019 15:32  --------------------------------------------------------  IN: 360 mL / OUT: 0 mL / NET: 360 mL    MEDICATIONS  (STANDING):  amLODIPine   Tablet 5 milliGRAM(s) Oral daily  atorvastatin 80 milliGRAM(s) Oral at bedtime  carvedilol 6.25 milliGRAM(s) Oral every 12 hours  chlorhexidine 4% Liquid 1 Application(s) Topical <User Schedule>  docusate sodium 100 milliGRAM(s) Oral two times a day    MEDICATIONS  (PRN):  ALBUTerol/ipratropium for Nebulization 3 milliLiter(s) Nebulizer every 6 hours PRN Shortness of Breath and/or Wheezing  sodium chloride 0.9% lock flush 10 milliLiter(s) IV Push every 1 hour PRN Pre/post blood products, medications, blood draw, and to maintain line patency    LABS:                        10.7   7.75  )-----------( 246      ( 08 Aug 2019 06:15 )             33.8     08-08    142  |  106  |  47.0<H>  ----------------------------<  127<H>  4.5   |  24.0  |  2.41<H>    Ca    9.4      08 Aug 2019 06:15  Phos  4.0     08-08  Mg     2.7     08-08    TPro  6.5<L>  /  Alb  3.8  /  TBili  0.5  /  DBili  x   /  AST  35  /  ALT  33  /  AlkPhos  90  08-08    RADIOLOGY & ADDITIONAL STUDIES:  CT Head No Cont (08.06.19 @ 11:38)   Redemonstration of an evolving hematoma within the left superior thalamus with mild surrounding vasogenic edema and mass effect.    No interval change when compared to the prior CT exam from earlier today. Nontraumatic intracerebral hemorrhage    HPI:  68 yo male pmhx HTN, CVA, MI s/p 2 POP placement at [facility] (~3 years ago), Prostate Ca s/p radiation and prostatectomy (~2 years ago), former smoker (quit 3 years ago, 30 pack year) transferred from [facility] with ICH.  Patient endorses that yesterday afternoon at ~1700 he began to experience right leg weakness and difficulty walking.  Patient presented to [facility] with persistent symptoms and found to have left thalmic ICH prompting transfer to [facility].  Upon arrival to [facility], patient A&O x3, with minimal right lower extremity weakness.  Patient endorses difficult walking, RLE weakness, no other complaints at this time. Admit to MICU. (05 Aug 2019 21:50)    Interval History:  Patient was seen and examined at bedside around 10 am. Complaining of weakness in RLE. Denies paresthesia, headache, dizziness, visual symptoms, nausea, vomiting, abdominal pain, chest pain, palpitations or shortness of breath.  Tele reviewed, HR in upper 40s at times.     ROS:  As per interval history otherwise unremarkable.    PHYSICAL EXAM:  Vital Signs   T(C): 36.8 (08 Aug 2019 10:04), Max: 36.9 (08 Aug 2019 05:08)  T(F): 98.3 (08 Aug 2019 10:04), Max: 98.5 (08 Aug 2019 05:08)  HR: 73 (08 Aug 2019 10:04) (59 - 73)  BP: 102/70 (08 Aug 2019 10:04) (102/70 - 147/65)  BP(mean): 93 (07 Aug 2019 19:00) (87 - 103)  RR: 17 (08 Aug 2019 10:04) (16 - 28)  SpO2: 94% (08 Aug 2019 10:04) (94% - 98%)  General: Well developed. Well nourished. No acute distress  HEENT: PERRLA. EOMI. Clear conjunctivae. Moist mucus membrane  Neck: Supple.   Chest: CTA bilaterally - no wheezing, rales or rhonchi.   Heart: Normal S1 & S2. RRR.    Abdomen: Obese. Soft. Non-tender. + BS  Ext: No pedal edema. No calf tenderness   Neuro: AAO x 3. CN II-XII grossly WNL. No speech disorder. Motor 5/5 in RUE, LUE & LLE. 3-4/5 in RLE. Sensation intact. Reflexes 2+. Cerebellar signs intact.   Skin: Warm and Dry  Psychiatry: Normal mood and affect    I&O's Summary    07 Aug 2019 07:01  -  08 Aug 2019 07:00  --------------------------------------------------------  IN: 1680 mL / OUT: 510 mL / NET: 1170 mL    08 Aug 2019 07:01  -  08 Aug 2019 15:32  --------------------------------------------------------  IN: 360 mL / OUT: 0 mL / NET: 360 mL    MEDICATIONS  (STANDING):  amLODIPine   Tablet 5 milliGRAM(s) Oral daily  atorvastatin 80 milliGRAM(s) Oral at bedtime  carvedilol 6.25 milliGRAM(s) Oral every 12 hours  chlorhexidine 4% Liquid 1 Application(s) Topical <User Schedule>  docusate sodium 100 milliGRAM(s) Oral two times a day    MEDICATIONS  (PRN):  ALBUTerol/ipratropium for Nebulization 3 milliLiter(s) Nebulizer every 6 hours PRN Shortness of Breath and/or Wheezing  sodium chloride 0.9% lock flush 10 milliLiter(s) IV Push every 1 hour PRN Pre/post blood products, medications, blood draw, and to maintain line patency    LABS:                        10.7   7.75  )-----------( 246      ( 08 Aug 2019 06:15 )             33.8     08-08    142  |  106  |  47.0<H>  ----------------------------<  127<H>  4.5   |  24.0  |  2.41<H>    Ca    9.4      08 Aug 2019 06:15  Phos  4.0     08-08  Mg     2.7     08-08    TPro  6.5<L>  /  Alb  3.8  /  TBili  0.5  /  DBili  x   /  AST  35  /  ALT  33  /  AlkPhos  90  08-08    RADIOLOGY & ADDITIONAL STUDIES:  CT Head No Cont (08.06.19 @ 11:38)   Redemonstration of an evolving hematoma within the left superior thalamus with mild surrounding vasogenic edema and mass effect.    No interval change when compared to the prior CT exam from earlier today.

## 2019-08-08 NOTE — PHYSICAL THERAPY INITIAL EVALUATION ADULT - PERTINENT HX OF CURRENT PROBLEM, REHAB EVAL
Pt with hx of CVA and MI presents to [facility] with reports of right sided weakness and toe dragging with ambulation.

## 2019-08-08 NOTE — PHYSICAL THERAPY INITIAL EVALUATION ADULT - PHYSICAL ASSIST/NONPHYSICAL ASSIST: GAIT, REHAB EVAL
supervision/supervision due to pt reporting right knee weakness and feels like it is making him off balance

## 2019-08-09 ENCOUNTER — TRANSCRIPTION ENCOUNTER (OUTPATIENT)
Age: 69
End: 2019-08-09

## 2019-08-09 VITALS
DIASTOLIC BLOOD PRESSURE: 80 MMHG | TEMPERATURE: 99 F | OXYGEN SATURATION: 98 % | HEART RATE: 72 BPM | SYSTOLIC BLOOD PRESSURE: 134 MMHG | RESPIRATION RATE: 17 BRPM

## 2019-08-09 PROCEDURE — 99239 HOSP IP/OBS DSCHRG MGMT >30: CPT

## 2019-08-09 RX ORDER — AMLODIPINE BESYLATE 2.5 MG/1
5 TABLET ORAL ONCE
Refills: 0 | Status: COMPLETED | OUTPATIENT
Start: 2019-08-09 | End: 2019-08-09

## 2019-08-09 RX ORDER — ASPIRIN/CALCIUM CARB/MAGNESIUM 324 MG
3 TABLET ORAL
Qty: 0 | Refills: 0 | DISCHARGE

## 2019-08-09 RX ORDER — ACETAMINOPHEN 500 MG
2 TABLET ORAL
Qty: 0 | Refills: 0 | DISCHARGE
Start: 2019-08-09

## 2019-08-09 RX ORDER — OMEGA-3 ACID ETHYL ESTERS 1 G
1 CAPSULE ORAL
Qty: 0 | Refills: 0 | DISCHARGE

## 2019-08-09 RX ORDER — CARVEDILOL PHOSPHATE 80 MG/1
1 CAPSULE, EXTENDED RELEASE ORAL
Qty: 0 | Refills: 0 | DISCHARGE

## 2019-08-09 RX ORDER — ASPIRIN/CALCIUM CARB/MAGNESIUM 324 MG
1 TABLET ORAL
Qty: 0 | Refills: 0 | DISCHARGE

## 2019-08-09 RX ORDER — CARVEDILOL PHOSPHATE 80 MG/1
1 CAPSULE, EXTENDED RELEASE ORAL
Qty: 60 | Refills: 0
Start: 2019-08-09 | End: 2019-09-07

## 2019-08-09 RX ORDER — AMLODIPINE BESYLATE 2.5 MG/1
10 TABLET ORAL DAILY
Refills: 0 | Status: DISCONTINUED | OUTPATIENT
Start: 2019-08-10 | End: 2019-08-09

## 2019-08-09 RX ORDER — SENNA PLUS 8.6 MG/1
2 TABLET ORAL
Qty: 60 | Refills: 0
Start: 2019-08-09 | End: 2019-09-07

## 2019-08-09 RX ORDER — DOCUSATE SODIUM 100 MG
1 CAPSULE ORAL
Qty: 60 | Refills: 0
Start: 2019-08-09 | End: 2019-09-07

## 2019-08-09 RX ORDER — AMLODIPINE BESYLATE 2.5 MG/1
1 TABLET ORAL
Qty: 30 | Refills: 0
Start: 2019-08-09 | End: 2019-09-07

## 2019-08-09 RX ADMIN — Medication 100 MILLIGRAM(S): at 05:08

## 2019-08-09 RX ADMIN — CHLORHEXIDINE GLUCONATE 1 APPLICATION(S): 213 SOLUTION TOPICAL at 09:30

## 2019-08-09 RX ADMIN — AMLODIPINE BESYLATE 5 MILLIGRAM(S): 2.5 TABLET ORAL at 05:08

## 2019-08-09 NOTE — DISCHARGE NOTE PROVIDER - NSDCFUADDAPPT_GEN_ALL_CORE_FT
Follow up with PMD in few days.  Follow up with Neurosurgery in few days.  Follow up with Neurology in few days.  Follow up with Nephrology in 1 week.

## 2019-08-09 NOTE — DISCHARGE NOTE PROVIDER - CARE PROVIDER_API CALL
Abby Elizabeth)  Family Medicine  Phone: [phone]  Fax: [phone]  Follow Up Time:     Tyrell Lafleur)  Neurosurgery  [facility], [address] [place] [address]  Phone: [phone]  Fax: [phone]  Follow Up Time:

## 2019-08-09 NOTE — DISCHARGE NOTE PROVIDER - NSDCCPCAREPLAN_GEN_ALL_CORE_FT
PRINCIPAL DISCHARGE DIAGNOSIS  Diagnosis: Thalamic hemorrhage with stroke  Assessment and Plan of Treatment: Goal SBP < 150.  Resume Aspirin after 8/12/19.  Follow up with Neurosurgery in few days.  Follow up with Neurology in few days.  Follow up with PMD in few days.      SECONDARY DISCHARGE DIAGNOSES  Diagnosis: CKD (chronic kidney disease), stage IV  Assessment and Plan of Treatment: Follow up with Nephrology in 1 week.    Diagnosis: CAD (coronary artery disease)  Assessment and Plan of Treatment: Resume Aspirin after 8/12/19.  Coreg was decreased to 6.25 mg BID.  Follow up with Cardio in 1 week.

## 2019-08-09 NOTE — DISCHARGE NOTE PROVIDER - CARE PROVIDERS DIRECT ADDRESSES
,DirectAddress_Unknown,[email or web address]

## 2019-08-09 NOTE — DISCHARGE NOTE PROVIDER - HOSPITAL COURSE
Patient was admitted with Intracranial Hemorrhage (left superior thalamus). He was a transfer from [facility] for Neurosurgical Evaluation. Seen by Neurosurgery and no intervention was recommended. He was monitored closely in ICU. Repeat CT Head with no change. Neurosurgery cleared him for discharge. His antihypertensive medications were adjusted. He was advised to avoid straining during defecation. Stool softeners were added. He will resume Aspirin after 8/12/19.    Weakness in RLE is slowly improving. Seen by PT and was recommended to discharge home. He is feeling much better and is stable for discharge.         PHYSICAL EXAM:    Vital Signs     T(C): 36.8 (09 Aug 2019 09:53), Max: 37 (08 Aug 2019 15:34)    T(F): 98.3 (09 Aug 2019 09:53), Max: 98.6 (08 Aug 2019 15:34)    HR: 76 (09 Aug 2019 09:30) (56 - 77)    BP: 106/64 (09 Aug 2019 09:30) (106/64 - 147/81)    RR: 19 (09 Aug 2019 09:30) (17 - 19)    SpO2: 94% (09 Aug 2019 09:30) (94% - 98%)    General: Well developed. Well nourished. No acute distress    HEENT: PERRLA. EOMI. Clear conjunctivae. Moist mucus membrane    Neck: Supple.     Chest: CTA bilaterally - no wheezing, rales or rhonchi.     Heart: Normal S1 & S2. RRR.      Abdomen: Obese. Soft. Non-tender. + BS    Ext: No pedal edema. No calf tenderness     Neuro: AAO x 3. CN II-XII grossly WNL. No speech disorder. Motor 5/5 in RUE, LUE & LLE. 3-4/5 in RLE. Sensation intact. Reflexes 2+. Cerebellar signs intact.     Skin: Warm and Dry    Psychiatry: Normal mood and affect        Time spent: 46 minutes

## 2019-08-14 ENCOUNTER — OUTPATIENT (OUTPATIENT)
Dept: OUTPATIENT SERVICES | Facility: HOSPITAL | Age: 69
LOS: 1 days | End: 2019-08-14
Payer: MEDICARE

## 2019-08-14 DIAGNOSIS — Z98.890 OTHER SPECIFIED POSTPROCEDURAL STATES: Chronic | ICD-10-CM

## 2019-08-14 PROCEDURE — 76856 US EXAM PELVIC COMPLETE: CPT | Mod: 26

## 2019-08-14 PROCEDURE — 76770 US EXAM ABDO BACK WALL COMP: CPT | Mod: 26

## 2019-08-29 ENCOUNTER — OUTPATIENT (OUTPATIENT)
Dept: OUTPATIENT SERVICES | Facility: HOSPITAL | Age: 69
LOS: 1 days | End: 2019-08-29

## 2019-08-29 DIAGNOSIS — Z98.890 OTHER SPECIFIED POSTPROCEDURAL STATES: Chronic | ICD-10-CM

## 2019-09-29 PROCEDURE — 82962 GLUCOSE BLOOD TEST: CPT

## 2019-09-29 PROCEDURE — 85610 PROTHROMBIN TIME: CPT

## 2019-09-29 PROCEDURE — C1751: CPT

## 2019-09-29 PROCEDURE — 85027 COMPLETE CBC AUTOMATED: CPT

## 2019-09-29 PROCEDURE — 80053 COMPREHEN METABOLIC PANEL: CPT

## 2019-09-29 PROCEDURE — 82550 ASSAY OF CK (CPK): CPT

## 2019-09-29 PROCEDURE — 70450 CT HEAD/BRAIN W/O DYE: CPT

## 2019-09-29 PROCEDURE — 83880 ASSAY OF NATRIURETIC PEPTIDE: CPT

## 2019-09-29 PROCEDURE — 84100 ASSAY OF PHOSPHORUS: CPT

## 2019-09-29 PROCEDURE — 84295 ASSAY OF SERUM SODIUM: CPT

## 2019-09-29 PROCEDURE — 93306 TTE W/DOPPLER COMPLETE: CPT

## 2019-09-29 PROCEDURE — 85014 HEMATOCRIT: CPT

## 2019-09-29 PROCEDURE — 83605 ASSAY OF LACTIC ACID: CPT

## 2019-09-29 PROCEDURE — 84132 ASSAY OF SERUM POTASSIUM: CPT

## 2019-09-29 PROCEDURE — 80048 BASIC METABOLIC PNL TOTAL CA: CPT

## 2019-09-29 PROCEDURE — 86803 HEPATITIS C AB TEST: CPT

## 2019-09-29 PROCEDURE — 85730 THROMBOPLASTIN TIME PARTIAL: CPT

## 2019-09-29 PROCEDURE — 93005 ELECTROCARDIOGRAM TRACING: CPT

## 2019-09-29 PROCEDURE — 82435 ASSAY OF BLOOD CHLORIDE: CPT

## 2019-09-29 PROCEDURE — 83735 ASSAY OF MAGNESIUM: CPT

## 2019-09-29 PROCEDURE — 99285 EMERGENCY DEPT VISIT HI MDM: CPT

## 2019-09-29 PROCEDURE — 82803 BLOOD GASES ANY COMBINATION: CPT

## 2019-09-29 PROCEDURE — 36415 COLL VENOUS BLD VENIPUNCTURE: CPT

## 2019-09-29 PROCEDURE — C1889: CPT

## 2019-09-29 PROCEDURE — 82947 ASSAY GLUCOSE BLOOD QUANT: CPT

## 2019-09-29 PROCEDURE — 84484 ASSAY OF TROPONIN QUANT: CPT

## 2019-09-29 PROCEDURE — 82330 ASSAY OF CALCIUM: CPT

## 2019-10-15 ENCOUNTER — OUTPATIENT (OUTPATIENT)
Dept: OUTPATIENT SERVICES | Facility: HOSPITAL | Age: 69
LOS: 1 days | End: 2019-10-15

## 2019-10-15 DIAGNOSIS — Z98.890 OTHER SPECIFIED POSTPROCEDURAL STATES: Chronic | ICD-10-CM

## 2019-11-14 ENCOUNTER — OUTPATIENT (OUTPATIENT)
Dept: OUTPATIENT SERVICES | Facility: HOSPITAL | Age: 69
LOS: 1 days | End: 2019-11-14

## 2019-11-14 DIAGNOSIS — Z98.890 OTHER SPECIFIED POSTPROCEDURAL STATES: Chronic | ICD-10-CM

## 2019-12-20 ENCOUNTER — OUTPATIENT (OUTPATIENT)
Dept: OUTPATIENT SERVICES | Facility: HOSPITAL | Age: 69
LOS: 1 days | End: 2019-12-20

## 2019-12-20 DIAGNOSIS — Z98.890 OTHER SPECIFIED POSTPROCEDURAL STATES: Chronic | ICD-10-CM

## 2020-03-02 ENCOUNTER — APPOINTMENT (OUTPATIENT)
Dept: RADIOLOGY | Facility: CLINIC | Age: 70
End: 2020-03-02
Payer: MEDICARE

## 2020-03-02 ENCOUNTER — OUTPATIENT (OUTPATIENT)
Dept: OUTPATIENT SERVICES | Facility: HOSPITAL | Age: 70
LOS: 1 days | End: 2020-03-02

## 2020-03-02 DIAGNOSIS — Z98.890 OTHER SPECIFIED POSTPROCEDURAL STATES: Chronic | ICD-10-CM

## 2020-03-02 PROBLEM — I63.9 CEREBRAL INFARCTION, UNSPECIFIED: Chronic | Status: ACTIVE | Noted: 2019-08-05

## 2020-03-02 PROBLEM — I25.10 ATHEROSCLEROTIC HEART DISEASE OF NATIVE CORONARY ARTERY WITHOUT ANGINA PECTORIS: Chronic | Status: ACTIVE | Noted: 2019-08-05

## 2020-03-02 PROBLEM — C61 MALIGNANT NEOPLASM OF PROSTATE: Chronic | Status: ACTIVE | Noted: 2019-08-05

## 2020-03-02 PROBLEM — I10 ESSENTIAL (PRIMARY) HYPERTENSION: Chronic | Status: ACTIVE | Noted: 2019-08-05

## 2020-03-02 PROBLEM — E78.6 LIPOPROTEIN DEFICIENCY: Chronic | Status: ACTIVE | Noted: 2019-08-05

## 2020-03-02 PROCEDURE — 72110 X-RAY EXAM L-2 SPINE 4/>VWS: CPT

## 2020-03-04 ENCOUNTER — OUTPATIENT (OUTPATIENT)
Dept: OUTPATIENT SERVICES | Facility: HOSPITAL | Age: 70
LOS: 1 days | End: 2020-03-04

## 2020-03-04 DIAGNOSIS — Z98.890 OTHER SPECIFIED POSTPROCEDURAL STATES: Chronic | ICD-10-CM

## 2021-11-23 NOTE — H&P ADULT - NSHPROSALLOTHERNEGRD_GEN_ALL_CORE
----- Message from Scout Young MD sent at 11/23/2021 10:49 AM CST -----  Fu after ct  ----- Message -----  From: Caty Kovacs CMA  Sent: 11/23/2021  10:38 AM CST  To: Scout Young MD    FYI-Patient calcium has been placed on hold d/t elevated ionized calcium. Patient is currently in process getting ct scan chest/abd/pelvis to evaluate ionized calcium    Scout Young MD   11/19/2021  1:04 PM CST         Continue miacalcin as well as ca with vit d  Fu as sched            All other review of systems negative, except as noted in HPI

## 2022-02-07 NOTE — PROCEDURE NOTE - NSPROCDETAILS_GEN_ALL_CORE
Patient: Marylu Garcia II    Procedure Summary     Date: 02/07/22 Room / Location: [address] [place] [address] ENDOSCOPY 04 / [address] [place] [address] ENDOSCOPY    Anesthesia Start: 1214 Anesthesia Stop: 7010    Procedure: COLONOSCOPY (N/A ) Diagnosis:       Personal history of colonic polyps      (Personal history of colonic polyps)    Surgeons: Radha Combs MD Anesthesiologist: Radha Macias CRNA    Anesthesia Type: MAC ASA Status: 2          Anesthesia Type: MAC    PACU Vitals    /58 (02/07/22 1308)    Temp      Pulse 52 (02/07/22 1308)   Resp 18 (02/07/22 1308)    SpO2 98 % (02/07/22 1308)        [facility] AN Post Evaluation:   Patient Evaluated in PACU  Patient Participation: complete - patient participated  Level of Consciousness: sleepy but conscious  Pain Score: 0  Pain Management: adequate  Airway Patency:patent  Dental exam unchanged from preop  Yes    Cardiovascular Status: acceptable  Respiratory Status: acceptable  Postoperative Hydration acceptable      Salvatore Hammans, CRNA  2/7/2022 1:09 PM
location identified, draped/prepped, sterile technique used, needle inserted/introduced/sutured in place/positive blood return obtained via catheter/connected to a pressurized flush line/Seldinger technique/ultrasound guidance/all materials/supplies accounted for at end of procedure
lumen(s) aspirated and flushed/sterile dressing applied/ultrasound guidance/sterile technique, catheter placed/guidewire recovered

## 2022-05-07 NOTE — DISCHARGE NOTE PROVIDER - NSFTFHOMEHTHYNRD_GEN_ALL_CORE
Airway       Patient location during procedure: OR       Procedure Start/Stop Times: 5/6/2022 10:31 PM  Staff -        Anesthesiologist:  Pavel Osborne MD       Resident/Fellow: Chen Hansen       Performed By: residentIndications and Patient Condition       Indications for airway management: eric-procedural       Induction type:intravenous       Mask difficulty assessment: 2 - vent by mask + OA or adjuvant +/- NMBA    Final Airway Details       Final airway type: endotracheal airway       Successful airway: ETT - single  Endotracheal Airway Details        ETT size (mm): 7.5       Cuffed: yes       Successful intubation technique: direct laryngoscopy       DL Blade Type: MAC 4       Grade View of Cords: 2       Adjucts: stylet       Position: Right       Measured from: gums/teeth       Secured at (cm): 23       Bite block used: Soft    Post intubation assessment        Placement verified by: capnometry and chest rise        Number of attempts at approach: 1       Secured with: silk tape       Ease of procedure: easy       Dentition: Unchanged    Medication(s) Administered   Medication Administration Time: 5/6/2022 10:31 PM       Yes

## 2024-01-29 NOTE — PROCEDURE NOTE - NSTIMEOUT_GEN_A_CORE
Patient's first and last name, , procedure, and correct site confirmed prior to the start of procedure. Refer to the Assessment tab to view/cancel completed assessment.

## 2025-05-27 NOTE — PATIENT PROFILE ADULT - CENTRAL VENOUS CATHETER/PICC LINE
Ambulatory Care Coordination Note     2025 11:49 AM     Patient Current Location:  [place]     ACM contacted the patient by telephone. Verified name and  with patient as identifiers.         ACM: Terri Rashid RN     Challenges to be reviewed by the provider   Additional needs identified to be addressed with provider No    None         Method of communication with provider: none.    Utilization: Patient has not had any utilization since our last call.     Care Summary Note: Patient was called for continued Care Coordination follow up and education re: the management of his DM, Hypertension, and healthcare needs.  Patient shared he continues to generally \"not feel well\" with no significant complaints.  Patient shared symptoms are slightly improved from last week when he reached out to PCP office.  Patient shared he returned to Mounjaro 10 mg yesterday and is hopeful this will help with recent symptoms.  Patient stated BP has slightly improved over the last week, but no specific readings available during our call.  Patient stated BS remain in the 200-220 range.  Importance of keeping BS controlled and DM managed reviewed with patient and patient acknowledged understanding.  ACM reviewed DM diet education and importance of ongoing diet adherence to also assist with BS control, especially with summer coming and holidays, and patient acknowledged understanding.  ACM reviewed BP and BS monitoring education and need to keep log to share with providers.  Signs and symptoms patient should report and the importance of early symptom recognition and follow up was also reviewed.  ACM also reviewed after hours resources with patient and he acknowledged understanding.  Patient denied any other questions, concerns, or needs and he was encouraged to call with any that may develop.       Offered patient enrollment in the Remote Patient Monitoring (RPM) program for in-home monitoring: Yes, but did not enroll at this time: 
no